# Patient Record
Sex: FEMALE | Race: WHITE | Employment: OTHER | URBAN - METROPOLITAN AREA
[De-identification: names, ages, dates, MRNs, and addresses within clinical notes are randomized per-mention and may not be internally consistent; named-entity substitution may affect disease eponyms.]

---

## 2017-01-09 ENCOUNTER — OFFICE VISIT (OUTPATIENT)
Dept: FAMILY MEDICINE CLINIC | Age: 77
End: 2017-01-09

## 2017-01-09 VITALS
OXYGEN SATURATION: 98 % | HEIGHT: 64 IN | WEIGHT: 143 LBS | DIASTOLIC BLOOD PRESSURE: 70 MMHG | TEMPERATURE: 100.1 F | BODY MASS INDEX: 24.41 KG/M2 | HEART RATE: 105 BPM | SYSTOLIC BLOOD PRESSURE: 108 MMHG

## 2017-01-09 DIAGNOSIS — J02.9 PHARYNGITIS, UNSPECIFIED ETIOLOGY: Primary | ICD-10-CM

## 2017-01-09 DIAGNOSIS — R05.9 COUGH: ICD-10-CM

## 2017-01-09 PROCEDURE — 99213 OFFICE O/P EST LOW 20 MIN: CPT | Performed by: FAMILY MEDICINE

## 2017-01-09 RX ORDER — BENZONATATE 100 MG/1
100 CAPSULE ORAL 3 TIMES DAILY PRN
Qty: 20 CAPSULE | Refills: 0 | Status: SHIPPED | OUTPATIENT
Start: 2017-01-09 | End: 2017-01-16

## 2017-01-09 RX ORDER — LEVOFLOXACIN 500 MG/1
500 TABLET, FILM COATED ORAL DAILY
Qty: 7 TABLET | Refills: 0 | Status: SHIPPED | OUTPATIENT
Start: 2017-01-09 | End: 2017-01-12 | Stop reason: SINTOL

## 2017-01-09 ASSESSMENT — ENCOUNTER SYMPTOMS
BACK PAIN: 0
SINUS PRESSURE: 0
COUGH: 1
EYE DISCHARGE: 0
BLOOD IN STOOL: 0
SORE THROAT: 0
CHEST TIGHTNESS: 1
VOMITING: 0
NAUSEA: 0
SHORTNESS OF BREATH: 0
DIARRHEA: 0

## 2017-01-09 ASSESSMENT — PATIENT HEALTH QUESTIONNAIRE - PHQ9
SUM OF ALL RESPONSES TO PHQ QUESTIONS 1-9: 0
2. FEELING DOWN, DEPRESSED OR HOPELESS: 0
1. LITTLE INTEREST OR PLEASURE IN DOING THINGS: 0
SUM OF ALL RESPONSES TO PHQ9 QUESTIONS 1 & 2: 0

## 2017-01-11 ENCOUNTER — TELEPHONE (OUTPATIENT)
Dept: FAMILY MEDICINE CLINIC | Age: 77
End: 2017-01-11

## 2017-01-12 ENCOUNTER — OFFICE VISIT (OUTPATIENT)
Dept: FAMILY MEDICINE CLINIC | Age: 77
End: 2017-01-12

## 2017-01-12 VITALS
HEART RATE: 92 BPM | SYSTOLIC BLOOD PRESSURE: 108 MMHG | BODY MASS INDEX: 24.21 KG/M2 | WEIGHT: 141.8 LBS | OXYGEN SATURATION: 98 % | DIASTOLIC BLOOD PRESSURE: 78 MMHG | TEMPERATURE: 98.1 F | HEIGHT: 64 IN

## 2017-01-12 DIAGNOSIS — J02.9 PHARYNGITIS, UNSPECIFIED ETIOLOGY: ICD-10-CM

## 2017-01-12 DIAGNOSIS — B37.0 ORAL CANDIDIASIS: Primary | ICD-10-CM

## 2017-01-12 PROCEDURE — 99213 OFFICE O/P EST LOW 20 MIN: CPT | Performed by: FAMILY MEDICINE

## 2017-01-12 RX ORDER — METHYLPREDNISOLONE 4 MG/1
TABLET ORAL
Qty: 1 KIT | Refills: 0 | Status: SHIPPED | OUTPATIENT
Start: 2017-01-12 | End: 2017-01-18

## 2017-01-12 RX ORDER — AZITHROMYCIN 250 MG/1
TABLET, FILM COATED ORAL
Qty: 1 PACKET | Refills: 0 | Status: SHIPPED | OUTPATIENT
Start: 2017-01-12 | End: 2017-04-25 | Stop reason: ALTCHOICE

## 2017-01-12 ASSESSMENT — ENCOUNTER SYMPTOMS
VOMITING: 0
SINUS PRESSURE: 0
SHORTNESS OF BREATH: 0
COUGH: 1
NAUSEA: 0
SORE THROAT: 0
BLOOD IN STOOL: 0
EYE DISCHARGE: 0
RHINORRHEA: 1
DIARRHEA: 0
BACK PAIN: 0

## 2017-01-16 ENCOUNTER — TELEPHONE (OUTPATIENT)
Dept: FAMILY MEDICINE CLINIC | Age: 77
End: 2017-01-16

## 2017-04-25 ENCOUNTER — OFFICE VISIT (OUTPATIENT)
Dept: FAMILY MEDICINE CLINIC | Age: 77
End: 2017-04-25

## 2017-04-25 VITALS
HEIGHT: 64 IN | WEIGHT: 141.6 LBS | DIASTOLIC BLOOD PRESSURE: 70 MMHG | TEMPERATURE: 97.7 F | OXYGEN SATURATION: 99 % | HEART RATE: 68 BPM | BODY MASS INDEX: 24.17 KG/M2 | SYSTOLIC BLOOD PRESSURE: 102 MMHG

## 2017-04-25 DIAGNOSIS — J02.9 PHARYNGITIS, UNSPECIFIED ETIOLOGY: Primary | ICD-10-CM

## 2017-04-25 PROCEDURE — 1090F PRES/ABSN URINE INCON ASSESS: CPT | Performed by: FAMILY MEDICINE

## 2017-04-25 PROCEDURE — 1036F TOBACCO NON-USER: CPT | Performed by: FAMILY MEDICINE

## 2017-04-25 PROCEDURE — 99213 OFFICE O/P EST LOW 20 MIN: CPT | Performed by: FAMILY MEDICINE

## 2017-04-25 PROCEDURE — 1123F ACP DISCUSS/DSCN MKR DOCD: CPT | Performed by: FAMILY MEDICINE

## 2017-04-25 PROCEDURE — G8400 PT W/DXA NO RESULTS DOC: HCPCS | Performed by: FAMILY MEDICINE

## 2017-04-25 PROCEDURE — G8420 CALC BMI NORM PARAMETERS: HCPCS | Performed by: FAMILY MEDICINE

## 2017-04-25 PROCEDURE — 4040F PNEUMOC VAC/ADMIN/RCVD: CPT | Performed by: FAMILY MEDICINE

## 2017-04-25 PROCEDURE — G8427 DOCREV CUR MEDS BY ELIG CLIN: HCPCS | Performed by: FAMILY MEDICINE

## 2017-04-25 RX ORDER — DOXYCYCLINE HYCLATE 100 MG
100 TABLET ORAL 2 TIMES DAILY
Qty: 14 TABLET | Refills: 0 | Status: SHIPPED | OUTPATIENT
Start: 2017-04-25 | End: 2017-05-02

## 2017-04-25 ASSESSMENT — ENCOUNTER SYMPTOMS
COUGH: 1
VOMITING: 0
EYE DISCHARGE: 0
BLOOD IN STOOL: 0
DIARRHEA: 0
SHORTNESS OF BREATH: 0
BACK PAIN: 0
SINUS PRESSURE: 0
SORE THROAT: 1
ABDOMINAL PAIN: 0
NAUSEA: 0

## 2017-04-27 ENCOUNTER — TELEPHONE (OUTPATIENT)
Dept: FAMILY MEDICINE CLINIC | Age: 77
End: 2017-04-27

## 2017-07-19 ENCOUNTER — OFFICE VISIT (OUTPATIENT)
Dept: FAMILY MEDICINE CLINIC | Age: 77
End: 2017-07-19

## 2017-07-19 VITALS
BODY MASS INDEX: 24.66 KG/M2 | HEART RATE: 53 BPM | DIASTOLIC BLOOD PRESSURE: 74 MMHG | WEIGHT: 139.2 LBS | SYSTOLIC BLOOD PRESSURE: 122 MMHG | HEIGHT: 63 IN | OXYGEN SATURATION: 97 %

## 2017-07-19 DIAGNOSIS — M81.0 OSTEOPOROSIS OF MULTIPLE SITES WITHOUT PATHOLOGICAL FRACTURE: ICD-10-CM

## 2017-07-19 DIAGNOSIS — Z76.89 ENCOUNTER TO ESTABLISH CARE: ICD-10-CM

## 2017-07-19 DIAGNOSIS — R59.0 ENLARGED LYMPH NODE IN NECK: Primary | ICD-10-CM

## 2017-07-19 DIAGNOSIS — F51.02 ADJUSTMENT INSOMNIA: Primary | ICD-10-CM

## 2017-07-19 DIAGNOSIS — F51.02 ADJUSTMENT INSOMNIA: ICD-10-CM

## 2017-07-19 DIAGNOSIS — Z23 NEED FOR PROPHYLACTIC VACCINATION AGAINST STREPTOCOCCUS PNEUMONIAE (PNEUMOCOCCUS): ICD-10-CM

## 2017-07-19 PROCEDURE — G0009 ADMIN PNEUMOCOCCAL VACCINE: HCPCS | Performed by: FAMILY MEDICINE

## 2017-07-19 PROCEDURE — 1123F ACP DISCUSS/DSCN MKR DOCD: CPT | Performed by: FAMILY MEDICINE

## 2017-07-19 PROCEDURE — 90670 PCV13 VACCINE IM: CPT | Performed by: FAMILY MEDICINE

## 2017-07-19 PROCEDURE — 1036F TOBACCO NON-USER: CPT | Performed by: FAMILY MEDICINE

## 2017-07-19 PROCEDURE — 4005F PHARM THX FOR OP RXD: CPT | Performed by: FAMILY MEDICINE

## 2017-07-19 PROCEDURE — 99214 OFFICE O/P EST MOD 30 MIN: CPT | Performed by: FAMILY MEDICINE

## 2017-07-19 PROCEDURE — G8400 PT W/DXA NO RESULTS DOC: HCPCS | Performed by: FAMILY MEDICINE

## 2017-07-19 PROCEDURE — 4040F PNEUMOC VAC/ADMIN/RCVD: CPT | Performed by: FAMILY MEDICINE

## 2017-07-19 PROCEDURE — 1090F PRES/ABSN URINE INCON ASSESS: CPT | Performed by: FAMILY MEDICINE

## 2017-07-19 PROCEDURE — G8427 DOCREV CUR MEDS BY ELIG CLIN: HCPCS | Performed by: FAMILY MEDICINE

## 2017-07-19 PROCEDURE — G8419 CALC BMI OUT NRM PARAM NOF/U: HCPCS | Performed by: FAMILY MEDICINE

## 2017-07-19 RX ORDER — ASCORBIC ACID 500 MG
500 TABLET ORAL DAILY
COMMUNITY

## 2017-07-19 RX ORDER — UBIDECARENONE 75 MG
50 CAPSULE ORAL DAILY
COMMUNITY

## 2017-07-19 RX ORDER — IBANDRONATE SODIUM 150 MG/1
150 TABLET, FILM COATED ORAL
COMMUNITY
End: 2017-09-13 | Stop reason: SINTOL

## 2017-07-19 RX ORDER — ZOLPIDEM TARTRATE 10 MG/1
10 TABLET ORAL
COMMUNITY
End: 2017-07-19 | Stop reason: SDUPTHER

## 2017-07-19 RX ORDER — VIT A/VIT C/VIT E/ZINC/COPPER 4296-226
CAPSULE ORAL
COMMUNITY
End: 2018-10-03 | Stop reason: SDUPTHER

## 2017-07-21 ENCOUNTER — TELEPHONE (OUTPATIENT)
Dept: FAMILY MEDICINE CLINIC | Age: 77
End: 2017-07-21

## 2017-07-21 PROBLEM — M81.0 OSTEOPOROSIS OF MULTIPLE SITES WITHOUT PATHOLOGICAL FRACTURE: Status: ACTIVE | Noted: 2017-07-21

## 2017-07-21 PROBLEM — R59.0 ENLARGED LYMPH NODE IN NECK: Status: ACTIVE | Noted: 2017-07-21

## 2017-07-21 PROBLEM — J02.9 PHARYNGITIS: Status: RESOLVED | Noted: 2017-01-09 | Resolved: 2017-07-21

## 2017-07-21 PROBLEM — F51.02 ADJUSTMENT INSOMNIA: Status: ACTIVE | Noted: 2017-07-21

## 2017-07-21 PROBLEM — B37.0 ORAL CANDIDIASIS: Status: RESOLVED | Noted: 2017-01-12 | Resolved: 2017-07-21

## 2017-07-21 RX ORDER — ZOLPIDEM TARTRATE 10 MG/1
10 TABLET ORAL NIGHTLY PRN
Qty: 30 TABLET | Refills: 2 | Status: SHIPPED | OUTPATIENT
Start: 2017-07-21 | End: 2018-03-14 | Stop reason: SDUPTHER

## 2017-07-21 ASSESSMENT — ENCOUNTER SYMPTOMS
COUGH: 0
DIARRHEA: 0
VOMITING: 0
NAUSEA: 0
CONSTIPATION: 0
SORE THROAT: 0
SHORTNESS OF BREATH: 0
BACK PAIN: 1
ABDOMINAL DISTENTION: 0
SINUS PRESSURE: 0

## 2017-07-24 ENCOUNTER — OFFICE VISIT (OUTPATIENT)
Dept: FAMILY MEDICINE CLINIC | Age: 77
End: 2017-07-24

## 2017-07-24 VITALS
SYSTOLIC BLOOD PRESSURE: 118 MMHG | HEART RATE: 70 BPM | OXYGEN SATURATION: 95 % | DIASTOLIC BLOOD PRESSURE: 68 MMHG | WEIGHT: 138 LBS | HEIGHT: 62 IN | BODY MASS INDEX: 25.4 KG/M2

## 2017-07-24 DIAGNOSIS — F51.02 ADJUSTMENT INSOMNIA: ICD-10-CM

## 2017-07-24 DIAGNOSIS — T78.40XA ALLERGIC REACTION TO DRUG, INITIAL ENCOUNTER: Primary | ICD-10-CM

## 2017-07-24 PROCEDURE — 4040F PNEUMOC VAC/ADMIN/RCVD: CPT | Performed by: FAMILY MEDICINE

## 2017-07-24 PROCEDURE — 1123F ACP DISCUSS/DSCN MKR DOCD: CPT | Performed by: FAMILY MEDICINE

## 2017-07-24 PROCEDURE — 1090F PRES/ABSN URINE INCON ASSESS: CPT | Performed by: FAMILY MEDICINE

## 2017-07-24 PROCEDURE — G8419 CALC BMI OUT NRM PARAM NOF/U: HCPCS | Performed by: FAMILY MEDICINE

## 2017-07-24 PROCEDURE — 1036F TOBACCO NON-USER: CPT | Performed by: FAMILY MEDICINE

## 2017-07-24 PROCEDURE — 99213 OFFICE O/P EST LOW 20 MIN: CPT | Performed by: FAMILY MEDICINE

## 2017-07-24 PROCEDURE — G8427 DOCREV CUR MEDS BY ELIG CLIN: HCPCS | Performed by: FAMILY MEDICINE

## 2017-07-24 PROCEDURE — G8400 PT W/DXA NO RESULTS DOC: HCPCS | Performed by: FAMILY MEDICINE

## 2017-07-24 ASSESSMENT — ENCOUNTER SYMPTOMS
VOMITING: 0
COUGH: 0
CONSTIPATION: 0
ABDOMINAL DISTENTION: 0
SINUS PRESSURE: 0
BACK PAIN: 1
SORE THROAT: 0
SHORTNESS OF BREATH: 0
DIARRHEA: 0
NAUSEA: 0

## 2017-09-13 ENCOUNTER — OFFICE VISIT (OUTPATIENT)
Dept: FAMILY MEDICINE CLINIC | Age: 77
End: 2017-09-13

## 2017-09-13 VITALS
WEIGHT: 136.8 LBS | HEIGHT: 63 IN | RESPIRATION RATE: 14 BRPM | OXYGEN SATURATION: 96 % | HEART RATE: 66 BPM | SYSTOLIC BLOOD PRESSURE: 124 MMHG | BODY MASS INDEX: 24.24 KG/M2 | DIASTOLIC BLOOD PRESSURE: 70 MMHG

## 2017-09-13 DIAGNOSIS — M81.0 OSTEOPOROSIS OF MULTIPLE SITES WITHOUT PATHOLOGICAL FRACTURE: ICD-10-CM

## 2017-09-13 DIAGNOSIS — F51.02 ADJUSTMENT INSOMNIA: Primary | ICD-10-CM

## 2017-09-13 PROCEDURE — 99213 OFFICE O/P EST LOW 20 MIN: CPT | Performed by: FAMILY MEDICINE

## 2017-09-13 PROCEDURE — G8420 CALC BMI NORM PARAMETERS: HCPCS | Performed by: FAMILY MEDICINE

## 2017-09-13 PROCEDURE — 1123F ACP DISCUSS/DSCN MKR DOCD: CPT | Performed by: FAMILY MEDICINE

## 2017-09-13 PROCEDURE — 1036F TOBACCO NON-USER: CPT | Performed by: FAMILY MEDICINE

## 2017-09-13 PROCEDURE — 1090F PRES/ABSN URINE INCON ASSESS: CPT | Performed by: FAMILY MEDICINE

## 2017-09-13 PROCEDURE — G8400 PT W/DXA NO RESULTS DOC: HCPCS | Performed by: FAMILY MEDICINE

## 2017-09-13 PROCEDURE — 4005F PHARM THX FOR OP RXD: CPT | Performed by: FAMILY MEDICINE

## 2017-09-13 PROCEDURE — 4040F PNEUMOC VAC/ADMIN/RCVD: CPT | Performed by: FAMILY MEDICINE

## 2017-09-13 PROCEDURE — G8427 DOCREV CUR MEDS BY ELIG CLIN: HCPCS | Performed by: FAMILY MEDICINE

## 2017-09-13 ASSESSMENT — ENCOUNTER SYMPTOMS
VOMITING: 0
NAUSEA: 0
SHORTNESS OF BREATH: 0
BACK PAIN: 1
DIARRHEA: 0
ABDOMINAL DISTENTION: 0
CONSTIPATION: 0
SINUS PRESSURE: 0
COUGH: 0
SORE THROAT: 0

## 2017-09-17 PROBLEM — Z96.0 VAGINAL PESSARY IN SITU: Status: ACTIVE | Noted: 2017-08-01

## 2017-12-06 ENCOUNTER — OFFICE VISIT (OUTPATIENT)
Dept: FAMILY MEDICINE CLINIC | Age: 77
End: 2017-12-06

## 2017-12-06 VITALS
OXYGEN SATURATION: 97 % | DIASTOLIC BLOOD PRESSURE: 74 MMHG | WEIGHT: 141.8 LBS | BODY MASS INDEX: 25.12 KG/M2 | SYSTOLIC BLOOD PRESSURE: 116 MMHG | HEART RATE: 86 BPM

## 2017-12-06 DIAGNOSIS — M79.604 ACUTE LEG PAIN, RIGHT: ICD-10-CM

## 2017-12-06 DIAGNOSIS — N30.01 ACUTE CYSTITIS WITH HEMATURIA: Primary | ICD-10-CM

## 2017-12-06 DIAGNOSIS — R30.0 DYSURIA: ICD-10-CM

## 2017-12-06 PROBLEM — Z96.0 VAGINAL PESSARY IN SITU: Status: RESOLVED | Noted: 2017-08-01 | Resolved: 2017-12-06

## 2017-12-06 LAB
BACTERIA: ABNORMAL /HPF
BILIRUBIN URINE: NEGATIVE
BILIRUBIN, POC: ABNORMAL
BLOOD URINE, POC: ABNORMAL
BLOOD, URINE: NEGATIVE
CLARITY, POC: ABNORMAL
CLARITY: ABNORMAL
COLOR, POC: ABNORMAL
COLOR: YELLOW
EPITHELIAL CELLS, UA: 3 /HPF (ref 0–5)
GLUCOSE URINE, POC: ABNORMAL
GLUCOSE URINE: NEGATIVE MG/DL
HYALINE CASTS: 0 /LPF (ref 0–8)
KETONES, POC: ABNORMAL
KETONES, URINE: NEGATIVE MG/DL
LEUKOCYTE EST, POC: POSITIVE
LEUKOCYTE ESTERASE, URINE: ABNORMAL
MICROSCOPIC EXAMINATION: YES
NITRITE, POC: ABNORMAL
NITRITE, URINE: POSITIVE
PH UA: 6
PH, POC: 6
PROTEIN UA: NEGATIVE MG/DL
PROTEIN, POC: ABNORMAL
RBC UA: 3 /HPF (ref 0–4)
SPECIFIC GRAVITY UA: 1.02
SPECIFIC GRAVITY, POC: 1.02
UROBILINOGEN, POC: 0.2
UROBILINOGEN, URINE: 0.2 E.U./DL
WBC UA: 343 /HPF (ref 0–5)

## 2017-12-06 PROCEDURE — 81001 URINALYSIS AUTO W/SCOPE: CPT | Performed by: FAMILY MEDICINE

## 2017-12-06 PROCEDURE — 99213 OFFICE O/P EST LOW 20 MIN: CPT | Performed by: FAMILY MEDICINE

## 2017-12-06 PROCEDURE — G8484 FLU IMMUNIZE NO ADMIN: HCPCS | Performed by: FAMILY MEDICINE

## 2017-12-06 PROCEDURE — G8400 PT W/DXA NO RESULTS DOC: HCPCS | Performed by: FAMILY MEDICINE

## 2017-12-06 PROCEDURE — 4040F PNEUMOC VAC/ADMIN/RCVD: CPT | Performed by: FAMILY MEDICINE

## 2017-12-06 PROCEDURE — 1090F PRES/ABSN URINE INCON ASSESS: CPT | Performed by: FAMILY MEDICINE

## 2017-12-06 PROCEDURE — G8427 DOCREV CUR MEDS BY ELIG CLIN: HCPCS | Performed by: FAMILY MEDICINE

## 2017-12-06 PROCEDURE — G8417 CALC BMI ABV UP PARAM F/U: HCPCS | Performed by: FAMILY MEDICINE

## 2017-12-06 PROCEDURE — 1036F TOBACCO NON-USER: CPT | Performed by: FAMILY MEDICINE

## 2017-12-06 PROCEDURE — 1123F ACP DISCUSS/DSCN MKR DOCD: CPT | Performed by: FAMILY MEDICINE

## 2017-12-06 RX ORDER — NITROFURANTOIN 25; 75 MG/1; MG/1
100 CAPSULE ORAL 2 TIMES DAILY
Qty: 20 CAPSULE | Refills: 0 | Status: SHIPPED | OUTPATIENT
Start: 2017-12-06 | End: 2017-12-08 | Stop reason: ALTCHOICE

## 2017-12-06 ASSESSMENT — ENCOUNTER SYMPTOMS
ABDOMINAL DISTENTION: 0
VOMITING: 0
COUGH: 0
NAUSEA: 0
SINUS PRESSURE: 0
SORE THROAT: 0
SHORTNESS OF BREATH: 0
BACK PAIN: 1
DIARRHEA: 0
CONSTIPATION: 0

## 2017-12-06 NOTE — PROGRESS NOTES
Vicky Range  1940  76 y.o. Allergies   Allergen Reactions    Prevnar 13 [Pneumococcal 13-Latisha Conj Vacc] Rash     Swelling of injection site and upper arm for more than 7 days. No infection.  Amoxicillin Rash    Flexeril [Cyclobenzaprine Hcl] Nausea And Vomiting       Chief Complaint   Patient presents with    Urinary Tract Infection     Urination every 10 to 15 minutes,  burning, yellow, ordor.  Ankle Pain     going to the knee, last for ten minutes. happen once. History of Present Illness  Dr Wiliam Gallegos:  Mrs. Allyn Quiroga is a 76 y.o. White woman with Osteoporosis and other medical problems who presents today for:     Right foot pain: On 12/3/2017, was busy at [employer], then got home late, laid down for a few minutes, severe pain made her jump up: was in pain for 10 minutes which then subsided. Started in the lateral ankle then went to the outside of the knee. No tenderness or tightness in the muscle. No tingling or weakness. Musculoskeletal ROS: negative for - gait disturbance, joint stiffness, joint swelling, muscle pain and muscular weakness. No pain since. UTI: started having symptoms on Dec. 1, frequency, urgency, No fever/chills, no vaginal discharge. The patient does have mild suprapubic pain. No flank pain. Insomnia: Patient states she is sleeping fine. The patient is currently taking Ambien (1/3 of a 10mg tab) about 3/7 nights/week. The other nights she takes melatonin. She denies any symptoms of abnormal behavior during the night when she is on Ambien. She states it helps her sleep very well. Osteoporosis: Patient has decided not to take osteoporosis medication secondary to side effects. Heart valve: She reports that she has a \"leaky valve\" for which she will see Dr. Brown Eller, her cardiologist, states is doing well. Bladder prolapse: She tried the pessary, but it came out and she has decided not to use it. Exercise: Walking with her neighbor 4 days/week.      The patient is  since 2007. She has 4 children in [facility], and [place], and several grandchildren. 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 She went to [facility] in 2012. She babysits her 25month-old granddaughter 2 times per week. Recently, her brother has been diagnosed with recurrence of breast CA and is starting chemo (9/2017)    The patient  reports that she has never smoked. She has never used smokeless tobacco..  The patient also  reports that she does not drink alcohol. Patient Medical History:    Medications:   On presentation AND new orders today :  Outpatient Prescriptions Marked as Taking for the 12/6/17 encounter (Office Visit) with Brett Mosqueda MD   Medication Sig Dispense Refill    nitrofurantoin, macrocrystal-monohydrate, (MACROBID) 100 MG capsule Take 1 capsule by mouth 2 times daily for 10 days 20 capsule 0    Melatonin-Pyridoxine (MELATONEX PO) Take 5 mg by mouth as needed      zolpidem (AMBIEN) 10 MG tablet Take 1 tablet by mouth nightly as needed for Sleep (Patient taking differently: Take 10 mg by mouth nightly as needed for Sleep Indications: taking 1/3 tab at night PRN ) 30 tablet 2    vitamin B-12 (CYANOCOBALAMIN) 100 MCG tablet Take 50 mcg by mouth daily      Ascorbic Acid (VITAMIN C) 500 MG tablet Take 500 mg by mouth daily      Multiple Vitamins-Minerals (ICAPS) CAPS Take by mouth      TURMERIC PO Take 1 tablet by mouth daily      CINNAMON PO Take by mouth daily      Multiple Vitamin (MULTI VITAMIN DAILY PO) Take by mouth daily      Calcium Citrate-Vitamin D (CALCIUM + D PO) Take by mouth daily         Past Medical History:   Diagnosis Date    [facility] ([facility] [address]     Dysuria     Edema     Female bladder prolapse     Hernia, hiatal     Insomnia     Lipoma of neck     Lumbago     Nodule of kidney     Osteoarthritis     Plantar fasciitis     PVD (peripheral vascular disease) (HCC)     Sinusitis     Varicose veins of both lower extremities 2014    Dr Baljit Mcdaniel        Past Surgical History:   Procedure Laterality Date    BLADDER REPAIR      HERNIA REPAIR      HYSTERECTOMY  10/10/2005    cervix ca-adenoid basal epithelioma    KNEE ARTHROSCOPY Left 04/07/2014    Dr Niecy Ospina        Family History   Problem Relation Age of Onset    Diabetes Mother     Colon Cancer Mother     Heart Failure Father     Cancer Sister      tonsil, galbladder    Diabetes Sister     Heart Failure Sister     Hypertension Sister    [place] Migraines Sister     Diabetes Brother     Heart Failure Brother     Cancer Other      several aunts and uncles    Breast Cancer Brother        Social History     Social History    Marital status:      Spouse name: N/A    Number of children: N/A    Years of education: N/A     Occupational History    Not on file. Social History Main Topics    Smoking status: Never Smoker    Smokeless tobacco: Never Used    Alcohol use No    Drug use: No    Sexual activity: No     Other Topics Concern    Not on file     Social History Narrative    No narrative on file       Review of Systems   Constitutional: Negative for chills and fatigue. HENT: Positive for hearing loss. Negative for congestion, sinus pressure and sore throat. Respiratory: Negative for cough and shortness of breath. Cardiovascular: Negative for chest pain, palpitations and leg swelling. Gastrointestinal: Negative for abdominal distention, constipation, diarrhea, nausea and vomiting. Endocrine: Negative for cold intolerance, heat intolerance, polydipsia and polyuria. Genitourinary: Positive for dysuria (occasional dysuria secondary to prolapse. ). Negative for frequency and hematuria. Dysuria worse in the last several days. See HPI. Musculoskeletal: Positive for back pain (sometimes). Negative for arthralgias. Neurological: Negative for dizziness, seizures, syncope, weakness and headaches.    Psychiatric/Behavioral:

## 2017-12-08 ENCOUNTER — TELEPHONE (OUTPATIENT)
Dept: FAMILY MEDICINE CLINIC | Age: 77
End: 2017-12-08

## 2017-12-08 DIAGNOSIS — N30.01 ACUTE CYSTITIS WITH HEMATURIA: Primary | ICD-10-CM

## 2017-12-08 LAB
ORGANISM: ABNORMAL
URINE CULTURE, ROUTINE: ABNORMAL
URINE CULTURE, ROUTINE: ABNORMAL

## 2017-12-08 RX ORDER — CIPROFLOXACIN 250 MG/1
250 TABLET, FILM COATED ORAL 2 TIMES DAILY
Qty: 10 TABLET | Refills: 0 | Status: SHIPPED | OUTPATIENT
Start: 2017-12-08 | End: 2018-01-10 | Stop reason: SDUPTHER

## 2017-12-08 NOTE — TELEPHONE ENCOUNTER
Please inform the patient that her urine culture came back showing that the medication I prescribed her will probably not work. I have sent in another prescription for ciprofloxacin twice a day for 5 days to her pharmacy. When she gets the new prescription she can stop the one I gave her at her appointment. If she has any questions please let me know.   Thank you, SABIHA

## 2018-01-10 ENCOUNTER — OFFICE VISIT (OUTPATIENT)
Dept: FAMILY MEDICINE CLINIC | Age: 78
End: 2018-01-10

## 2018-01-10 VITALS
DIASTOLIC BLOOD PRESSURE: 70 MMHG | TEMPERATURE: 97.2 F | HEART RATE: 80 BPM | SYSTOLIC BLOOD PRESSURE: 122 MMHG | WEIGHT: 140 LBS | OXYGEN SATURATION: 99 % | BODY MASS INDEX: 24.8 KG/M2

## 2018-01-10 DIAGNOSIS — N39.0 RECURRENT UTI: Primary | ICD-10-CM

## 2018-01-10 DIAGNOSIS — R35.0 URINARY FREQUENCY: ICD-10-CM

## 2018-01-10 DIAGNOSIS — R31.0 GROSS HEMATURIA: ICD-10-CM

## 2018-01-10 DIAGNOSIS — N81.10 BLADDER PROLAPSE, FEMALE, ACQUIRED: ICD-10-CM

## 2018-01-10 LAB
BILIRUBIN, POC: NEGATIVE
BLOOD URINE, POC: ABNORMAL
CLARITY, POC: CLEAR
COLOR, POC: YELLOW
GLUCOSE URINE, POC: NEGATIVE
KETONES, POC: NEGATIVE
LEUKOCYTE EST, POC: ABNORMAL
NITRITE, POC: POSITIVE
PH, POC: 6.5
PROTEIN, POC: NEGATIVE
SPECIFIC GRAVITY, POC: 1020
UROBILINOGEN, POC: 0.2

## 2018-01-10 PROCEDURE — G8420 CALC BMI NORM PARAMETERS: HCPCS | Performed by: FAMILY MEDICINE

## 2018-01-10 PROCEDURE — 81002 URINALYSIS NONAUTO W/O SCOPE: CPT | Performed by: FAMILY MEDICINE

## 2018-01-10 PROCEDURE — 1123F ACP DISCUSS/DSCN MKR DOCD: CPT | Performed by: FAMILY MEDICINE

## 2018-01-10 PROCEDURE — 1090F PRES/ABSN URINE INCON ASSESS: CPT | Performed by: FAMILY MEDICINE

## 2018-01-10 PROCEDURE — G8427 DOCREV CUR MEDS BY ELIG CLIN: HCPCS | Performed by: FAMILY MEDICINE

## 2018-01-10 PROCEDURE — 1036F TOBACCO NON-USER: CPT | Performed by: FAMILY MEDICINE

## 2018-01-10 PROCEDURE — 99213 OFFICE O/P EST LOW 20 MIN: CPT | Performed by: FAMILY MEDICINE

## 2018-01-10 PROCEDURE — 4040F PNEUMOC VAC/ADMIN/RCVD: CPT | Performed by: FAMILY MEDICINE

## 2018-01-10 PROCEDURE — G8484 FLU IMMUNIZE NO ADMIN: HCPCS | Performed by: FAMILY MEDICINE

## 2018-01-10 PROCEDURE — G8400 PT W/DXA NO RESULTS DOC: HCPCS | Performed by: FAMILY MEDICINE

## 2018-01-10 RX ORDER — CIPROFLOXACIN 250 MG/1
250 TABLET, FILM COATED ORAL 2 TIMES DAILY
Qty: 14 TABLET | Refills: 0 | Status: SHIPPED | OUTPATIENT
Start: 2018-01-10 | End: 2018-01-17

## 2018-01-10 ASSESSMENT — ENCOUNTER SYMPTOMS
CONSTIPATION: 0
DIARRHEA: 0
ABDOMINAL DISTENTION: 0
SORE THROAT: 0
COUGH: 0
SINUS PRESSURE: 0
VOMITING: 0
NAUSEA: 0
BACK PAIN: 1
SHORTNESS OF BREATH: 0

## 2018-01-10 ASSESSMENT — PATIENT HEALTH QUESTIONNAIRE - PHQ9
1. LITTLE INTEREST OR PLEASURE IN DOING THINGS: 0
2. FEELING DOWN, DEPRESSED OR HOPELESS: 0
SUM OF ALL RESPONSES TO PHQ QUESTIONS 1-9: 0
SUM OF ALL RESPONSES TO PHQ9 QUESTIONS 1 & 2: 0

## 2018-01-10 NOTE — PROGRESS NOTES
oz (64.3 kg)   09/13/17 136 lb 12.8 oz (62.1 kg)     Body mass index is 24.8 kg/m². Physical Exam   Constitutional: She is oriented to person, place, and time. She appears well-developed and well-nourished. The patient is in no distress. HENT:   Head: Normocephalic and atraumatic. Mouth/Throat: Oropharynx is clear and moist.   Cardiovascular: Normal rate, regular rhythm and normal heart sounds. Pulmonary/Chest: Breath sounds normal. She has no wheezes. She has no rales. Abdominal: Soft. Bowel sounds are normal. She exhibits no distension. There is no tenderness. There is no rebound. There is mild suprapubic tenderness. Musculoskeletal: She exhibits no edema, tenderness or deformity. Neurological: She is alert and oriented to person, place, and time. Skin: Skin is warm and dry. No rash noted. No erythema. Psychiatric: She has a normal mood and affect. Thought content normal.   Vitals reviewed. Today's Point of Care Results:   Results for POC orders placed in visit on 01/10/18   POCT Urinalysis no Micro   Result Value Ref Range    Color, UA yellow     Clarity, UA clear     Glucose, UA POC negative     Bilirubin, UA negative     Ketones, UA negative     Spec Grav, UA 1,020     Blood, UA POC trace-intact     pH, UA 6.5     Protein, UA POC negative     Urobilinogen, UA 0.2     Leukocytes, UA small     Nitrite, UA positive         Diagnosis, Assessment, Plan, and Associated Orders:    1. Recurrent UTI  ciprofloxacin (CIPRO) 250 MG tablet   2. Urinary frequency  POCT Urinalysis no Micro    Urine culture clean catch   3. Gross hematuria  POCT Urinalysis no Micro    Urine culture clean catch   4. Bladder prolapse, female, acquired       Plan:   Last culture sensitive to Cipro. Will send Culture. The patient believes that nothing can be done about her prolapse without surgery. She feels she needs to take care of her granddaughter's baby, so doesn't have time for the surgery.     Lakshmi received

## 2018-01-12 PROBLEM — Z63.4 WIDOWED: Status: ACTIVE | Noted: 2018-01-12

## 2018-01-12 PROBLEM — Z87.440 HISTORY OF RECURRENT UTIS: Status: ACTIVE | Noted: 2018-01-12

## 2018-01-12 PROBLEM — I35.1 MODERATE AORTIC REGURGITATION: Status: ACTIVE | Noted: 2018-01-12

## 2018-01-12 LAB
ORGANISM: ABNORMAL
URINE CULTURE, ROUTINE: ABNORMAL
URINE CULTURE, ROUTINE: ABNORMAL

## 2018-03-14 ENCOUNTER — OFFICE VISIT (OUTPATIENT)
Dept: FAMILY MEDICINE CLINIC | Age: 78
End: 2018-03-14

## 2018-03-14 VITALS
SYSTOLIC BLOOD PRESSURE: 124 MMHG | HEART RATE: 72 BPM | HEIGHT: 63 IN | OXYGEN SATURATION: 98 % | BODY MASS INDEX: 24.31 KG/M2 | WEIGHT: 137.2 LBS | DIASTOLIC BLOOD PRESSURE: 84 MMHG

## 2018-03-14 DIAGNOSIS — F51.02 ADJUSTMENT INSOMNIA: ICD-10-CM

## 2018-03-14 DIAGNOSIS — Z00.00 ROUTINE GENERAL MEDICAL EXAMINATION AT A HEALTH CARE FACILITY: Primary | ICD-10-CM

## 2018-03-14 PROCEDURE — G0438 PPPS, INITIAL VISIT: HCPCS | Performed by: FAMILY MEDICINE

## 2018-03-14 RX ORDER — ZOLPIDEM TARTRATE 10 MG/1
10 TABLET ORAL NIGHTLY PRN
Qty: 30 TABLET | Refills: 1 | Status: SHIPPED | OUTPATIENT
Start: 2018-03-14 | End: 2018-09-17 | Stop reason: SDUPTHER

## 2018-03-14 ASSESSMENT — PATIENT HEALTH QUESTIONNAIRE - PHQ9: SUM OF ALL RESPONSES TO PHQ QUESTIONS 1-9: 0

## 2018-03-14 ASSESSMENT — ANXIETY QUESTIONNAIRES: GAD7 TOTAL SCORE: 0

## 2018-03-14 ASSESSMENT — LIFESTYLE VARIABLES: HOW OFTEN DO YOU HAVE A DRINK CONTAINING ALCOHOL: 0

## 2018-03-14 NOTE — PROGRESS NOTES
([facility] [address]     Sinusitis     Varicose veins of both lower extremities 2014    Dr Cathleen Ceballos     Past Surgical History:   Procedure Laterality Date    BLADDER REPAIR  2005    HERNIA REPAIR      HYSTERECTOMY, TOTAL ABDOMINAL  10/10/2015    cervix ca-adenoid basal epithelioma    KNEE ARTHROSCOPY Left 04/07/2014    Dr Dorcas Montes       Family History   Problem Relation Age of Onset    Diabetes Mother     Colon Cancer Mother     Heart Failure Father     Cancer Sister      tonsil, galbladder    Diabetes Sister     Heart Failure Sister     Hypertension Sister    [place] Migraines Sister     Diabetes Brother     Heart Failure Brother     Cancer Other      several aunts and uncles    Breast Cancer Brother        CareTeam (Including outside providers/suppliers regularly involved in providing care):   Patient Care Team:  Wolfgang Hatchet, MD as PCP - General (Family Medicine)  Wolfgang Hatchet, MD as PCP - [facility] Attributed Provider  Chaparro Russ MD as Consulting Physician (Gastroenterology)  Mynor Engel MD as Consulting Physician (Cardiology)    Wt Readings from Last 3 Encounters:   03/14/18 137 lb 3.2 oz (62.2 kg)   01/10/18 140 lb (63.5 kg)   12/06/17 141 lb 12.8 oz (64.3 kg)     Vitals:    03/14/18 0956   BP: 124/84   Site: Left Arm   Position: Sitting   Cuff Size: Medium Adult   Pulse: 72   SpO2: 98%   Weight: 137 lb 3.2 oz (62.2 kg)   Height: 5' 3.39\" (1.61 m)       General Appearance: alert and oriented to person, place and time, well developed and well- nourished, in no acute distress. Appears younger than stated age.    Skin: warm and dry, no rash or erythema  Head: normocephalic and atraumatic  Eyes: pupils equal, round, and reactive to light,    ENT: tympanic membrane, external ear and ear canal normal bilaterally, nose without deformity,   Neck: supple and non-tender without mass,  no cervical lymphadenopathy  Pulmonary/Chest: clear to auscultation bilaterally- no wheezes, rales or rhonchi, normal air movement, no respiratory distress  Cardiovascular: normal rate, regular rhythm, normal S1 and S2, Has aortic regurgitation. Abdomen: soft, non-tender, non-distended, normal bowel sounds, no masses or organomegaly  Extremities: no cyanosis, clubbing or edema  Musculoskeletal: normal range of motion for age, no joint swelling, deformity or tenderness  Neurologic: reflexes normal and symmetric, no cranial nerve deficit, gait, coordination and speech normal    Patient's complete Health Risk Assessment and screening values have been reviewed and are found in Flowsheets. The following problems were reviewed today and where indicated follow up appointments were made and/or referrals ordered. Positive Risk Factor Screenings with Interventions:       Hearing/Vision Interventions:  Plans to get a vision check soon. Last glasses 1-2 years ago. Sees Dr. Jennifer Sahu    Safety Interventions:  · Home safety tips provided  Rugs that she uses are non-slip. Diagnosis and Plan:     1. Routine general medical examination at a health care facility     2. Adjustment insomnia  zolpidem (AMBIEN) 10 MG tablet     Patient will continue following with cardiology for valve. Continue Zolpidem 10 mg, 1/3 tablet nightly as needed for sleep. Personalized Preventive Plan     Current Health Maintenance Status  Immunization History   Administered Date(s) Administered    Pneumococcal 13-valent Conjugate ([id number]) 07/19/2017    Td 12/12/2011        Health Maintenance   Topic Date Due    Shingles Vaccine (1 of 2 - 2 Dose Series) 03/24/1988    DTaP/Tdap/Td vaccine (1 - Tdap) 12/13/2011    Flu vaccine (1) 09/01/2017    DEXA (modify frequency per FRAX score)  Completed     Recommendations for Preventive Services Due: see orders. Recommended screening schedule for the next 5-10 years is provided to the patient in written form: see Patient Instructions/AVS.    Return recheck sleep in 6 months, for Medicare Annual Wellness Visit in 1 year.

## 2018-03-15 ENCOUNTER — TELEPHONE (OUTPATIENT)
Dept: FAMILY MEDICINE CLINIC | Age: 78
End: 2018-03-15

## 2018-03-16 NOTE — TELEPHONE ENCOUNTER
I left a message on the patient's phone explaining that one prescription is enough for 3 months since she takes only 1/3 tab each night. One refill gives her six months of medication. Please try to contact the patient again to give her the above information.  SABIHA

## 2018-04-17 ENCOUNTER — TELEPHONE (OUTPATIENT)
Dept: FAMILY MEDICINE CLINIC | Age: 78
End: 2018-04-17

## 2018-04-18 ENCOUNTER — OFFICE VISIT (OUTPATIENT)
Dept: FAMILY MEDICINE CLINIC | Age: 78
End: 2018-04-18

## 2018-04-18 VITALS
DIASTOLIC BLOOD PRESSURE: 78 MMHG | WEIGHT: 139.6 LBS | OXYGEN SATURATION: 98 % | HEART RATE: 77 BPM | BODY MASS INDEX: 24.43 KG/M2 | TEMPERATURE: 97.9 F | SYSTOLIC BLOOD PRESSURE: 124 MMHG

## 2018-04-18 DIAGNOSIS — J01.90 ACUTE BACTERIAL SINUSITIS: Primary | ICD-10-CM

## 2018-04-18 DIAGNOSIS — R05.9 COUGH: ICD-10-CM

## 2018-04-18 DIAGNOSIS — B96.89 ACUTE BACTERIAL SINUSITIS: Primary | ICD-10-CM

## 2018-04-18 PROCEDURE — 99213 OFFICE O/P EST LOW 20 MIN: CPT | Performed by: NURSE PRACTITIONER

## 2018-04-18 RX ORDER — BENZONATATE 100 MG/1
100 CAPSULE ORAL 3 TIMES DAILY PRN
Qty: 21 CAPSULE | Refills: 0 | Status: SHIPPED | OUTPATIENT
Start: 2018-04-18 | End: 2018-04-25

## 2018-04-18 RX ORDER — DOXYCYCLINE HYCLATE 100 MG
100 TABLET ORAL 2 TIMES DAILY
Qty: 20 TABLET | Refills: 0 | Status: SHIPPED | OUTPATIENT
Start: 2018-04-18 | End: 2018-08-01 | Stop reason: SDUPTHER

## 2018-04-18 RX ORDER — GUAIFENESIN 600 MG/1
600 TABLET, EXTENDED RELEASE ORAL 2 TIMES DAILY
Qty: 30 TABLET | Refills: 0 | Status: SHIPPED | OUTPATIENT
Start: 2018-04-18 | End: 2018-10-03 | Stop reason: ALTCHOICE

## 2018-04-18 ASSESSMENT — ENCOUNTER SYMPTOMS
EYE REDNESS: 0
SINUS PRESSURE: 1
SINUS PAIN: 1
WHEEZING: 0
EYE ITCHING: 0
SHORTNESS OF BREATH: 0
SORE THROAT: 1
CHEST TIGHTNESS: 1
NAUSEA: 0
RHINORRHEA: 1
EYE DISCHARGE: 0
EYE PAIN: 0
VOMITING: 0
SWOLLEN GLANDS: 1
DIARRHEA: 0
COUGH: 1

## 2018-04-27 ENCOUNTER — TELEPHONE (OUTPATIENT)
Dept: FAMILY MEDICINE CLINIC | Age: 78
End: 2018-04-27

## 2018-04-27 DIAGNOSIS — L84 CORNS AND CALLUS: Primary | ICD-10-CM

## 2018-04-27 DIAGNOSIS — M21.611 BUNION, RIGHT: ICD-10-CM

## 2018-08-01 ENCOUNTER — OFFICE VISIT (OUTPATIENT)
Dept: FAMILY MEDICINE CLINIC | Age: 78
End: 2018-08-01

## 2018-08-01 VITALS
HEIGHT: 63 IN | TEMPERATURE: 98.1 F | WEIGHT: 139.8 LBS | DIASTOLIC BLOOD PRESSURE: 70 MMHG | BODY MASS INDEX: 24.77 KG/M2 | RESPIRATION RATE: 16 BRPM | SYSTOLIC BLOOD PRESSURE: 124 MMHG | HEART RATE: 77 BPM | OXYGEN SATURATION: 97 %

## 2018-08-01 DIAGNOSIS — F51.02 ADJUSTMENT INSOMNIA: ICD-10-CM

## 2018-08-01 DIAGNOSIS — J01.90 ACUTE BACTERIAL SINUSITIS: Primary | ICD-10-CM

## 2018-08-01 DIAGNOSIS — B96.89 ACUTE BACTERIAL SINUSITIS: Primary | ICD-10-CM

## 2018-08-01 PROCEDURE — 1036F TOBACCO NON-USER: CPT | Performed by: FAMILY MEDICINE

## 2018-08-01 PROCEDURE — 1123F ACP DISCUSS/DSCN MKR DOCD: CPT | Performed by: FAMILY MEDICINE

## 2018-08-01 PROCEDURE — 99213 OFFICE O/P EST LOW 20 MIN: CPT | Performed by: FAMILY MEDICINE

## 2018-08-01 PROCEDURE — G8420 CALC BMI NORM PARAMETERS: HCPCS | Performed by: FAMILY MEDICINE

## 2018-08-01 PROCEDURE — 1101F PT FALLS ASSESS-DOCD LE1/YR: CPT | Performed by: FAMILY MEDICINE

## 2018-08-01 PROCEDURE — G8427 DOCREV CUR MEDS BY ELIG CLIN: HCPCS | Performed by: FAMILY MEDICINE

## 2018-08-01 PROCEDURE — 4040F PNEUMOC VAC/ADMIN/RCVD: CPT | Performed by: FAMILY MEDICINE

## 2018-08-01 PROCEDURE — 1090F PRES/ABSN URINE INCON ASSESS: CPT | Performed by: FAMILY MEDICINE

## 2018-08-01 PROCEDURE — G8400 PT W/DXA NO RESULTS DOC: HCPCS | Performed by: FAMILY MEDICINE

## 2018-08-01 RX ORDER — DOXYCYCLINE HYCLATE 100 MG
100 TABLET ORAL 2 TIMES DAILY
Qty: 20 TABLET | Refills: 0 | Status: SHIPPED | OUTPATIENT
Start: 2018-08-01 | End: 2018-08-11

## 2018-08-01 RX ORDER — ZOLPIDEM TARTRATE 10 MG/1
TABLET ORAL NIGHTLY PRN
COMMUNITY
End: 2018-09-17 | Stop reason: SDUPTHER

## 2018-08-01 NOTE — PROGRESS NOTES
Keyonna Lambert  1940  [de-identified] y.o. Chief Complaint   Patient presents with    Congestion    Headache   . Subjective:      Dr Mitchell School: Keyonna Lambert is a [de-identified] y.o. White  woman who presents today for follow up on her chronic medical conditions as noted below and for URI:    Patient Active Problem List:     Chronic pain of both knees     Bladder prolapse, female, acquired     Enlarged lymph node in neck     Adjustment insomnia     Osteoporosis of multiple sites without pathological fracture     Moderate aortic regurgitation     History of recurrent UTIs          Upper Respiratory Infection: Patient complains of symptoms of a URI. Symptoms include congestion, cough, plugged sensation in nose and sore throat. Onset of symptoms was 8 days ago, gradually worsening since that time. She also c/o productive cough with  yellow colored sputum for the past 3 days . She is drinking plenty of fluids. Evaluation to date: none. Treatment to date: NSAID. For headache. Denies fever, had chills last night, full in the sinuses also, Getting worse not better. Wavy lines in eyes: many times in the past month, last month: left eye couldn't focus, no headache afterward. Explained these may be migraine associated. Had gone to [facility] for her [employer] graduation. Flew back on Sunday: there 5 days. Was sick while there. The patient  reports that she has never smoked. She has never used smokeless tobacco.. The patient also  reports that she does not drink alcohol. Allergies   Allergen Reactions    Prevnar 13 [Pneumococcal 13-Latisha Conj Vacc] Rash     Swelling of injection site and upper arm for more than 7 days. No infection.  Amoxicillin Rash    Flexeril [Cyclobenzaprine Hcl] Nausea And Vomiting       Medications:   On presentation AND new orders today :  Outpatient Prescriptions Marked as Taking for the 8/1/18 encounter (Office Visit) with Mitchell Cruz MD   Medication Sig Dispense Refill  zolpidem (AMBIEN) 10 MG tablet Take by mouth nightly as needed for Sleep. Sara Shelton doxycycline hyclate (VIBRA-TABS) 100 MG tablet Take 1 tablet by mouth 2 times daily for 10 days 20 tablet 0    Melatonin-Pyridoxine (MELATONEX PO) Take 5 mg by mouth as needed      vitamin B-12 (CYANOCOBALAMIN) 100 MCG tablet Take 50 mcg by mouth daily      Ascorbic Acid (VITAMIN C) 500 MG tablet Take 500 mg by mouth daily      Multiple Vitamins-Minerals (ICAPS) CAPS Take by mouth      TURMERIC PO Take 1 tablet by mouth daily      CINNAMON PO Take by mouth daily      Multiple Vitamin (MULTI VITAMIN DAILY PO) Take by mouth daily      Calcium Citrate-Vitamin D (CALCIUM + D PO) Take by mouth daily         Past Medical History:   Diagnosis Date    [facility]erv[facility] ([facility] [address]     Dysuria     Edema     Female bladder prolapse     Hernia, hiatal     Insomnia     Lipoma of neck     Lumbago     Nodule of kidney     Osteoarthritis     Plantar fasciitis     [facility]VD [facility] vascular disease) ([facility])     Sinusitis     Varicose veins of both lower extremities 2014    Dr Rosalio Dai        Review of Systems   Constitutional: Had chills, No fever and unexpected weight change. HENT: Negative for sore throat and congestion. Eyes: Negative for pain and itching. Respiratory: Has cough and No shortness of breath. Cardiovascular: Negative for chest pain   Gastrointestinal: Negative for abdominal pain, diarrhea, nausea and vomiting. Genitourinary: Negative for dysuria. Musculoskeletal: Negative for leg swelling. Skin: Negative for rash. Neurological: No headache or dizziness. Psychiatric/Behavioral: Negative for dysphoric mood, sleep disturbance and suicidal ideas.         Objective:      /70 (Site: Left Arm, Position: Sitting, Cuff Size: Medium Adult)   Pulse 77   Temp 98.1 °F (36.7 °C) (Oral)   Resp 16   Ht 5' 3.39\" (1.61 m)   Wt 139 lb 12.8 oz (63.4 kg)   SpO2 97%   BMI 24.46 kg/m²    BP Readings from Last 3

## 2018-09-17 ENCOUNTER — OFFICE VISIT (OUTPATIENT)
Dept: FAMILY MEDICINE CLINIC | Age: 78
End: 2018-09-17

## 2018-09-17 VITALS
OXYGEN SATURATION: 97 % | WEIGHT: 139.4 LBS | DIASTOLIC BLOOD PRESSURE: 72 MMHG | HEIGHT: 63 IN | SYSTOLIC BLOOD PRESSURE: 124 MMHG | RESPIRATION RATE: 14 BRPM | BODY MASS INDEX: 24.7 KG/M2 | HEART RATE: 72 BPM

## 2018-09-17 DIAGNOSIS — R53.83 FATIGUE, UNSPECIFIED TYPE: ICD-10-CM

## 2018-09-17 DIAGNOSIS — F51.02 ADJUSTMENT INSOMNIA: ICD-10-CM

## 2018-09-17 DIAGNOSIS — Z13.31 POSITIVE DEPRESSION SCREENING: ICD-10-CM

## 2018-09-17 DIAGNOSIS — F32.9 REACTIVE DEPRESSION: Primary | ICD-10-CM

## 2018-09-17 LAB
A/G RATIO: 1.9 (ref 1.1–2.2)
ALBUMIN SERPL-MCNC: 4.1 G/DL (ref 3.4–5)
ALP BLD-CCNC: 60 U/L (ref 40–129)
ALT SERPL-CCNC: 14 U/L (ref 10–40)
ANION GAP SERPL CALCULATED.3IONS-SCNC: 10 MMOL/L (ref 3–16)
AST SERPL-CCNC: 16 U/L (ref 15–37)
BASOPHILS ABSOLUTE: 0 K/UL (ref 0–0.2)
BASOPHILS RELATIVE PERCENT: 0.8 %
BILIRUB SERPL-MCNC: 0.4 MG/DL (ref 0–1)
BUN BLDV-MCNC: 14 MG/DL (ref 7–20)
CALCIUM SERPL-MCNC: 9.4 MG/DL (ref 8.3–10.6)
CHLORIDE BLD-SCNC: 106 MMOL/L (ref 99–110)
CO2: 27 MMOL/L (ref 21–32)
CREAT SERPL-MCNC: 0.6 MG/DL (ref 0.6–1.2)
EOSINOPHILS ABSOLUTE: 0.1 K/UL (ref 0–0.6)
EOSINOPHILS RELATIVE PERCENT: 1.6 %
GFR AFRICAN AMERICAN: >60
GFR NON-AFRICAN AMERICAN: >60
GLOBULIN: 2.2 G/DL
GLUCOSE FASTING: 82 MG/DL (ref 70–99)
HCT VFR BLD CALC: 43.5 % (ref 36–48)
HEMOGLOBIN: 14 G/DL (ref 12–16)
LYMPHOCYTES ABSOLUTE: 1.6 K/UL (ref 1–5.1)
LYMPHOCYTES RELATIVE PERCENT: 30 %
MCH RBC QN AUTO: 26.8 PG (ref 26–34)
MCHC RBC AUTO-ENTMCNC: 32.2 G/DL (ref 31–36)
MCV RBC AUTO: 83.1 FL (ref 80–100)
MONOCYTES ABSOLUTE: 0.6 K/UL (ref 0–1.3)
MONOCYTES RELATIVE PERCENT: 10.7 %
NEUTROPHILS ABSOLUTE: 3 K/UL (ref 1.7–7.7)
NEUTROPHILS RELATIVE PERCENT: 56.9 %
PDW BLD-RTO: 13.7 % (ref 12.4–15.4)
PLATELET # BLD: 207 K/UL (ref 135–450)
PMV BLD AUTO: 7.9 FL (ref 5–10.5)
POTASSIUM SERPL-SCNC: 4.4 MMOL/L (ref 3.5–5.1)
RBC # BLD: 5.24 M/UL (ref 4–5.2)
SODIUM BLD-SCNC: 143 MMOL/L (ref 136–145)
TOTAL PROTEIN: 6.3 G/DL (ref 6.4–8.2)
TSH REFLEX: 1.54 UIU/ML (ref 0.27–4.2)
WBC # BLD: 5.2 K/UL (ref 4–11)

## 2018-09-17 PROCEDURE — 4040F PNEUMOC VAC/ADMIN/RCVD: CPT | Performed by: FAMILY MEDICINE

## 2018-09-17 PROCEDURE — G0444 DEPRESSION SCREEN ANNUAL: HCPCS | Performed by: FAMILY MEDICINE

## 2018-09-17 PROCEDURE — 1036F TOBACCO NON-USER: CPT | Performed by: FAMILY MEDICINE

## 2018-09-17 PROCEDURE — 1101F PT FALLS ASSESS-DOCD LE1/YR: CPT | Performed by: FAMILY MEDICINE

## 2018-09-17 PROCEDURE — G8400 PT W/DXA NO RESULTS DOC: HCPCS | Performed by: FAMILY MEDICINE

## 2018-09-17 PROCEDURE — 99214 OFFICE O/P EST MOD 30 MIN: CPT | Performed by: FAMILY MEDICINE

## 2018-09-17 PROCEDURE — 1123F ACP DISCUSS/DSCN MKR DOCD: CPT | Performed by: FAMILY MEDICINE

## 2018-09-17 PROCEDURE — G8431 POS CLIN DEPRES SCRN F/U DOC: HCPCS | Performed by: FAMILY MEDICINE

## 2018-09-17 PROCEDURE — G8427 DOCREV CUR MEDS BY ELIG CLIN: HCPCS | Performed by: FAMILY MEDICINE

## 2018-09-17 PROCEDURE — 1090F PRES/ABSN URINE INCON ASSESS: CPT | Performed by: FAMILY MEDICINE

## 2018-09-17 PROCEDURE — G8420 CALC BMI NORM PARAMETERS: HCPCS | Performed by: FAMILY MEDICINE

## 2018-09-17 PROCEDURE — 36415 COLL VENOUS BLD VENIPUNCTURE: CPT | Performed by: FAMILY MEDICINE

## 2018-09-17 RX ORDER — ZOLPIDEM TARTRATE 10 MG/1
10 TABLET ORAL NIGHTLY PRN
Qty: 30 TABLET | Refills: 1 | Status: SHIPPED | OUTPATIENT
Start: 2018-09-17 | End: 2019-02-27 | Stop reason: SDUPTHER

## 2018-09-17 ASSESSMENT — PATIENT HEALTH QUESTIONNAIRE - PHQ9
10. IF YOU CHECKED OFF ANY PROBLEMS, HOW DIFFICULT HAVE THESE PROBLEMS MADE IT FOR YOU TO DO YOUR WORK, TAKE CARE OF THINGS AT HOME, OR GET ALONG WITH OTHER PEOPLE: 1
4. FEELING TIRED OR HAVING LITTLE ENERGY: 3
SUM OF ALL RESPONSES TO PHQ QUESTIONS 1-9: 5
SUM OF ALL RESPONSES TO PHQ9 QUESTIONS 1 & 2: 0
9. THOUGHTS THAT YOU WOULD BE BETTER OFF DEAD, OR OF HURTING YOURSELF: 0
5. POOR APPETITE OR OVEREATING: 2
2. FEELING DOWN, DEPRESSED OR HOPELESS: 0
7. TROUBLE CONCENTRATING ON THINGS, SUCH AS READING THE NEWSPAPER OR WATCHING TELEVISION: 0
8. MOVING OR SPEAKING SO SLOWLY THAT OTHER PEOPLE COULD HAVE NOTICED. OR THE OPPOSITE, BEING SO FIGETY OR RESTLESS THAT YOU HAVE BEEN MOVING AROUND A LOT MORE THAN USUAL: 0
3. TROUBLE FALLING OR STAYING ASLEEP: 0
6. FEELING BAD ABOUT YOURSELF - OR THAT YOU ARE A FAILURE OR HAVE LET YOURSELF OR YOUR FAMILY DOWN: 0
1. LITTLE INTEREST OR PLEASURE IN DOING THINGS: 0
SUM OF ALL RESPONSES TO PHQ QUESTIONS 1-9: 5

## 2018-09-17 NOTE — PROGRESS NOTES
distress. Head: Normocephalic and atraumatic. Mouth/Throat: Oropharynx is clear and moist. No cervical lymphadenopathy. Cardiovascular: Normal rate, regular rhythm and normal heart sounds. Pulmonary/Chest: Breath sounds normal.   Abdominal: Soft. Bowel sounds are normal.   Musculoskeletal: Limbs intact X 4. Legs are free of edema. Skin: Skin is warm and dry. Psychiatric: Patient has a normal mood and affect. Thought content normal.   Vitals reviewed. Cognition is good. Lab Results   Component Value Date    WBC 5.2 09/17/2018    HGB 14.0 09/17/2018    HCT 43.5 09/17/2018    MCV 83.1 09/17/2018     09/17/2018     Lab Results   Component Value Date     09/17/2018    K 4.4 09/17/2018     09/17/2018    CO2 27 09/17/2018    BUN 14 09/17/2018    CREATININE 0.6 09/17/2018    GLUCOSE 93 09/16/2016    CALCIUM 9.4 09/17/2018    PROT 6.3 (L) 09/17/2018    LABALBU 4.1 09/17/2018    BILITOT 0.4 09/17/2018    ALKPHOS 60 09/17/2018    AST 16 09/17/2018    ALT 14 09/17/2018    LABGLOM >60 09/17/2018    GFRAA >60 09/17/2018    AGRATIO 1.9 09/17/2018    GLOB 2.2 09/17/2018     Results for Saba Dias (MRN [id number]) as of 9/18/2018 09:26   Ref. Range 9/17/2018 10:46   TSH Latest Ref Range: 0.27 - 4.20 uIU/mL 1.54         Today's Point of Care Results: No results found for this visit on 09/17/18. PHQ Scores 9/17/2018 3/14/2018 1/10/2018 1/9/2017   PHQ2 Score 0 0 0 0   PHQ9 Score 5 0 0 0     Interpretation of Total Score Depression Severity: 1-4 = Minimal depression, 5-9 = Mild depression, 10-14 = Moderate depression, 15-19 = Moderately severe depression, 20-27 = Severe depression       Assessment / Plan:       Diagnosis Orders   1. Reactive depression  FLUoxetine (PROZAC) 10 MG capsule   2. Fatigue, unspecified type  Comprehensive Metabolic Panel, Fasting    TSH with Reflex    CBC Auto Differential   3. Adjustment insomnia  zolpidem (AMBIEN) 10 MG tablet   4.  Positive depression screening  Positive Screen for Clinical Depression with a Documented Follow-up Plan       Plan:  Fatigue/Depression: All tests appear normal. Fatigue and lack of motivation most likely mild depression. I spoke to the patient at length about the need to say no to some of the demands that people place on her. She states she is unable to get her own work done because she's called to help others. I also encouraged her to get others to help her with some of her household tasks. The patient verbalized understanding and appreciation of the idea of scheduling her own list on her calendar. On the basis of positive PHQ-9 screening (PHQ-9 Total Score: 5), the following plan was implemented: medication prescribed: Prozac- 10 mg 3 times a week. The medication was discussed at length with the patient. She will call for any significant medication side effects or worsening symptoms of depression. Patient will follow-up in 3 week(s) with PCP. Insomnia: Continue low-dose Ambien. Restrictions: None, patient retired      Dwayne Roman received counseling on the following healthy behaviors: nutrition, medication adherence and taking time for herself. Discussed use, benefit, and side effects of prescribed medications as prescribed above. Patient instructed to notify our office if side effects develop from the medications. Barriers to medication compliance addressed. All patient questions answered. Pt voiced understanding. Patient will return in 2-3 weeks for recheck of new medication. Return for recheck sleep in 3 months. Please note this report has been partially produced using speech recognition software and may contain errors related to that system including errors in grammar, punctuation, and spelling, as well as words and phrases that may be inappropriate. If there are any questions or concerns please feel free to contact the dictating provider for clarification.

## 2018-09-18 ENCOUNTER — TELEPHONE (OUTPATIENT)
Dept: FAMILY MEDICINE CLINIC | Age: 78
End: 2018-09-18

## 2018-09-18 RX ORDER — FLUOXETINE 10 MG/1
10 CAPSULE ORAL
Qty: 15 CAPSULE | Refills: 5 | Status: SHIPPED | OUTPATIENT
Start: 2018-09-19 | End: 2018-10-03

## 2018-09-18 NOTE — TELEPHONE ENCOUNTER
Please inform the patient that her lab results are normal. Her liver, kidneys, and thyroid are normal and so is her blood count. She does not have anemia. I am ordering the medications, fluoxetine, that we talked about. She should take it every Monday, Wednesday, and Friday. Please schedule her to see me in 3-4 weeks for recheck of the new medication. We can review the results at that visit if there are any questions.  Thank you, SABIHA

## 2018-10-03 ENCOUNTER — OFFICE VISIT (OUTPATIENT)
Dept: FAMILY MEDICINE CLINIC | Age: 78
End: 2018-10-03
Payer: MEDICARE

## 2018-10-03 VITALS
DIASTOLIC BLOOD PRESSURE: 74 MMHG | SYSTOLIC BLOOD PRESSURE: 102 MMHG | HEART RATE: 70 BPM | TEMPERATURE: 98.3 F | BODY MASS INDEX: 24.57 KG/M2 | WEIGHT: 140.4 LBS | OXYGEN SATURATION: 98 %

## 2018-10-03 DIAGNOSIS — R05.9 COUGH: ICD-10-CM

## 2018-10-03 DIAGNOSIS — J01.10 ACUTE NON-RECURRENT FRONTAL SINUSITIS: Primary | ICD-10-CM

## 2018-10-03 PROCEDURE — 99213 OFFICE O/P EST LOW 20 MIN: CPT | Performed by: NURSE PRACTITIONER

## 2018-10-03 RX ORDER — DOXYCYCLINE 100 MG/1
100 TABLET ORAL 2 TIMES DAILY
Qty: 14 TABLET | Refills: 0 | Status: SHIPPED | OUTPATIENT
Start: 2018-10-03 | End: 2018-10-10

## 2018-10-03 RX ORDER — LATANOPROST 50 UG/ML
SOLUTION/ DROPS OPHTHALMIC
COMMUNITY
Start: 2018-08-30

## 2018-10-08 ENCOUNTER — TELEPHONE (OUTPATIENT)
Dept: FAMILY MEDICINE CLINIC | Age: 78
End: 2018-10-08

## 2018-10-19 ASSESSMENT — ENCOUNTER SYMPTOMS
HEARTBURN: 0
TROUBLE SWALLOWING: 0
SINUS COMPLAINT: 1
VOMITING: 0
CHEST TIGHTNESS: 0
DIARRHEA: 0
SWOLLEN GLANDS: 0
NAUSEA: 0
WHEEZING: 1
COUGH: 1
EYES NEGATIVE: 1
ABDOMINAL PAIN: 0
RHINORRHEA: 1
HOARSE VOICE: 0
SORE THROAT: 0
SINUS PRESSURE: 1
HEMOPTYSIS: 0
SHORTNESS OF BREATH: 0

## 2018-10-31 ENCOUNTER — OFFICE VISIT (OUTPATIENT)
Dept: FAMILY MEDICINE CLINIC | Age: 78
End: 2018-10-31
Payer: MEDICARE

## 2018-10-31 VITALS
OXYGEN SATURATION: 97 % | DIASTOLIC BLOOD PRESSURE: 76 MMHG | HEIGHT: 63 IN | WEIGHT: 141.6 LBS | RESPIRATION RATE: 14 BRPM | BODY MASS INDEX: 25.09 KG/M2 | HEART RATE: 66 BPM | SYSTOLIC BLOOD PRESSURE: 110 MMHG

## 2018-10-31 DIAGNOSIS — F51.02 ADJUSTMENT INSOMNIA: ICD-10-CM

## 2018-10-31 DIAGNOSIS — F32.9 REACTIVE DEPRESSION: Primary | ICD-10-CM

## 2018-10-31 DIAGNOSIS — J01.10 ACUTE NON-RECURRENT FRONTAL SINUSITIS: ICD-10-CM

## 2018-10-31 PROCEDURE — G8420 CALC BMI NORM PARAMETERS: HCPCS | Performed by: FAMILY MEDICINE

## 2018-10-31 PROCEDURE — 1036F TOBACCO NON-USER: CPT | Performed by: FAMILY MEDICINE

## 2018-10-31 PROCEDURE — 1123F ACP DISCUSS/DSCN MKR DOCD: CPT | Performed by: FAMILY MEDICINE

## 2018-10-31 PROCEDURE — 99213 OFFICE O/P EST LOW 20 MIN: CPT | Performed by: FAMILY MEDICINE

## 2018-10-31 PROCEDURE — 1101F PT FALLS ASSESS-DOCD LE1/YR: CPT | Performed by: FAMILY MEDICINE

## 2018-10-31 PROCEDURE — G8400 PT W/DXA NO RESULTS DOC: HCPCS | Performed by: FAMILY MEDICINE

## 2018-10-31 PROCEDURE — G8427 DOCREV CUR MEDS BY ELIG CLIN: HCPCS | Performed by: FAMILY MEDICINE

## 2018-10-31 PROCEDURE — G8484 FLU IMMUNIZE NO ADMIN: HCPCS | Performed by: FAMILY MEDICINE

## 2018-10-31 PROCEDURE — 4040F PNEUMOC VAC/ADMIN/RCVD: CPT | Performed by: FAMILY MEDICINE

## 2018-10-31 PROCEDURE — 1090F PRES/ABSN URINE INCON ASSESS: CPT | Performed by: FAMILY MEDICINE

## 2018-10-31 NOTE — PROGRESS NOTES
Susan Gomez  1940  [de-identified] y.o. Chief Complaint   Patient presents with    Medication Check     stopped Prozac made her feel jittery   . Subjective:      Dr Emily Bone: Susan Gomez is a [de-identified] y.o. White  woman who presents today for follow up on her chronic medical conditions as noted below and for mild depression:    Patient Active Problem List:     Chronic pain of both knees     Bladder prolapse, female, acquired     Enlarged lymph node in neck     Adjustment insomnia     Osteoporosis of multiple sites without pathological fracture     Moderate aortic regurgitation     History of recurrent UTIs          Patient last seen by me on 9/17/2018 for depression and lack of motivation. Fluoxetine started 10 mg 3 times/week. Seen HCR for sinusitis 10/3/18: doxycycline started if not better. Patient had stopped fluoxetine. Past URI: Took antibiotic, but took 3 weeks to get better  Depression/Lack of Motivation: Took 3 Prozac capsules, but felt jittery. Is thankful for advice about making appointments to do the things she needs to get done. She is feeling much better even without medication. Still taking care of her 1year-old granddaughter 8 hours 3 times a week. States she is happy that this little girl keeps her busy. Saw Cardiologist, Dr. Jaquelin Jiménez, on Monday: Will be doing nuclear stress test and ECHO for \"leaky valves\". Patient denies any exertional chest pain, dyspnea, palpitations, syncope, orthopnea, edema or paroxysmal nocturnal dyspnea. Sleep: doing very well on Ambien. The patient  reports that she has never smoked. She has never used smokeless tobacco.. The patient also reports that she does not drink alcohol. Allergies   Allergen Reactions    Prevnar 13 [Pneumococcal 13-Latisha Conj Vacc] Rash     Swelling of injection site and upper arm for more than 7 days. No infection.  Amoxicillin Rash    Flexeril [Cyclobenzaprine Hcl] Nausea And Vomiting       Medications:   On presentation AND new

## 2019-02-19 ENCOUNTER — TELEPHONE (OUTPATIENT)
Dept: FAMILY MEDICINE CLINIC | Age: 79
End: 2019-02-19

## 2019-02-27 ENCOUNTER — TELEPHONE (OUTPATIENT)
Dept: GASTROENTEROLOGY | Age: 79
End: 2019-02-27

## 2019-02-27 ENCOUNTER — OFFICE VISIT (OUTPATIENT)
Dept: FAMILY MEDICINE CLINIC | Age: 79
End: 2019-02-27
Payer: MEDICARE

## 2019-02-27 VITALS
HEIGHT: 63 IN | SYSTOLIC BLOOD PRESSURE: 110 MMHG | RESPIRATION RATE: 12 BRPM | HEART RATE: 63 BPM | WEIGHT: 136.6 LBS | DIASTOLIC BLOOD PRESSURE: 68 MMHG | OXYGEN SATURATION: 100 % | BODY MASS INDEX: 24.2 KG/M2

## 2019-02-27 DIAGNOSIS — R47.02 DYSPHASIA: Primary | ICD-10-CM

## 2019-02-27 DIAGNOSIS — F51.02 ADJUSTMENT INSOMNIA: ICD-10-CM

## 2019-02-27 PROCEDURE — 1036F TOBACCO NON-USER: CPT | Performed by: FAMILY MEDICINE

## 2019-02-27 PROCEDURE — 99213 OFFICE O/P EST LOW 20 MIN: CPT | Performed by: FAMILY MEDICINE

## 2019-02-27 PROCEDURE — 1123F ACP DISCUSS/DSCN MKR DOCD: CPT | Performed by: FAMILY MEDICINE

## 2019-02-27 PROCEDURE — G8427 DOCREV CUR MEDS BY ELIG CLIN: HCPCS | Performed by: FAMILY MEDICINE

## 2019-02-27 PROCEDURE — G8420 CALC BMI NORM PARAMETERS: HCPCS | Performed by: FAMILY MEDICINE

## 2019-02-27 PROCEDURE — 1090F PRES/ABSN URINE INCON ASSESS: CPT | Performed by: FAMILY MEDICINE

## 2019-02-27 PROCEDURE — G8400 PT W/DXA NO RESULTS DOC: HCPCS | Performed by: FAMILY MEDICINE

## 2019-02-27 PROCEDURE — G8484 FLU IMMUNIZE NO ADMIN: HCPCS | Performed by: FAMILY MEDICINE

## 2019-02-27 PROCEDURE — 1101F PT FALLS ASSESS-DOCD LE1/YR: CPT | Performed by: FAMILY MEDICINE

## 2019-02-27 PROCEDURE — 4040F PNEUMOC VAC/ADMIN/RCVD: CPT | Performed by: FAMILY MEDICINE

## 2019-02-27 RX ORDER — ZOLPIDEM TARTRATE 10 MG/1
10 TABLET ORAL NIGHTLY PRN
Qty: 30 TABLET | Refills: 1 | Status: SHIPPED | OUTPATIENT
Start: 2019-02-27 | End: 2019-04-28

## 2019-02-27 RX ORDER — ZOLPIDEM TARTRATE 10 MG/1
TABLET ORAL NIGHTLY PRN
COMMUNITY
End: 2019-02-27 | Stop reason: SDUPTHER

## 2019-02-27 ASSESSMENT — PATIENT HEALTH QUESTIONNAIRE - PHQ9
SUM OF ALL RESPONSES TO PHQ9 QUESTIONS 1 & 2: 0
2. FEELING DOWN, DEPRESSED OR HOPELESS: 0
1. LITTLE INTEREST OR PLEASURE IN DOING THINGS: 0
SUM OF ALL RESPONSES TO PHQ QUESTIONS 1-9: 0
SUM OF ALL RESPONSES TO PHQ QUESTIONS 1-9: 0

## 2019-03-01 PROBLEM — R47.02 DYSPHASIA: Status: ACTIVE | Noted: 2019-03-01

## 2019-03-01 RX ORDER — OMEPRAZOLE 40 MG/1
40 CAPSULE, DELAYED RELEASE ORAL DAILY
Qty: 30 CAPSULE | Refills: 3 | Status: SHIPPED | OUTPATIENT
Start: 2019-03-01 | End: 2019-04-10

## 2019-03-04 ENCOUNTER — TELEPHONE (OUTPATIENT)
Dept: FAMILY MEDICINE CLINIC | Age: 79
End: 2019-03-04

## 2019-03-22 ENCOUNTER — TELEPHONE (OUTPATIENT)
Dept: GASTROENTEROLOGY | Age: 79
End: 2019-03-22

## 2019-04-10 ENCOUNTER — HOSPITAL ENCOUNTER (OUTPATIENT)
Dept: WOMENS IMAGING | Age: 79
Discharge: HOME | End: 2019-04-10
Payer: COMMERCIAL

## 2019-04-10 ENCOUNTER — OFFICE VISIT (OUTPATIENT)
Dept: FAMILY MEDICINE CLINIC | Age: 79
End: 2019-04-10
Payer: MEDICARE

## 2019-04-10 VITALS
WEIGHT: 137.2 LBS | TEMPERATURE: 98.3 F | OXYGEN SATURATION: 99 % | SYSTOLIC BLOOD PRESSURE: 124 MMHG | HEART RATE: 79 BPM | DIASTOLIC BLOOD PRESSURE: 82 MMHG | BODY MASS INDEX: 24.01 KG/M2

## 2019-04-10 DIAGNOSIS — Z12.31 VISIT FOR SCREENING MAMMOGRAM: ICD-10-CM

## 2019-04-10 DIAGNOSIS — J01.00 ACUTE NON-RECURRENT MAXILLARY SINUSITIS: Primary | ICD-10-CM

## 2019-04-10 PROCEDURE — 1123F ACP DISCUSS/DSCN MKR DOCD: CPT | Performed by: NURSE PRACTITIONER

## 2019-04-10 PROCEDURE — 99213 OFFICE O/P EST LOW 20 MIN: CPT | Performed by: NURSE PRACTITIONER

## 2019-04-10 PROCEDURE — 4040F PNEUMOC VAC/ADMIN/RCVD: CPT | Performed by: NURSE PRACTITIONER

## 2019-04-10 PROCEDURE — G8427 DOCREV CUR MEDS BY ELIG CLIN: HCPCS | Performed by: NURSE PRACTITIONER

## 2019-04-10 PROCEDURE — 1036F TOBACCO NON-USER: CPT | Performed by: NURSE PRACTITIONER

## 2019-04-10 PROCEDURE — 1090F PRES/ABSN URINE INCON ASSESS: CPT | Performed by: NURSE PRACTITIONER

## 2019-04-10 PROCEDURE — G8400 PT W/DXA NO RESULTS DOC: HCPCS | Performed by: NURSE PRACTITIONER

## 2019-04-10 PROCEDURE — G8420 CALC BMI NORM PARAMETERS: HCPCS | Performed by: NURSE PRACTITIONER

## 2019-04-10 RX ORDER — DOXYCYCLINE HYCLATE 100 MG
100 TABLET ORAL 2 TIMES DAILY
Qty: 14 TABLET | Refills: 0 | Status: SHIPPED | OUTPATIENT
Start: 2019-04-10 | End: 2019-04-17

## 2019-04-10 ASSESSMENT — ENCOUNTER SYMPTOMS
SHORTNESS OF BREATH: 0
SINUS PRESSURE: 1
COUGH: 0
SINUS PAIN: 1
SORE THROAT: 1
SINUS COMPLAINT: 1

## 2019-04-10 NOTE — PROGRESS NOTES
Procedure Laterality Date    BLADDER REPAIR  2005    HERNIA REPAIR      HYSTERECTOMY, TOTAL ABDOMINAL  10/10/2015    cervix ca-adenoid basal epithelioma    KNEE ARTHROSCOPY Left 04/07/2014    Dr Ila Robert     Family History   Problem Relation Age of Onset    Diabetes Mother     Colon Cancer Mother     Heart Failure Father     Cancer Sister         tonsil, galbladder    Diabetes Sister     Heart Failure Sister     Hypertension Sister    [place]geron Migraines Sister     Diabetes Brother     Heart Failure Brother     Cancer Other         several aunts and uncles    Breast Cancer Brother      Social History     Socioeconomic History    Marital status:       Spouse name: Not on file    Number of children: Not on file    Years of education: Not on file    Highest education level: Not on file   Occupational History    Not on file   Social Needs    Financial resource strain: Not on file    Food insecurity:     Worry: Not on file     Inability: Not on file    Transportation needs:     Medical: Not on file     Non-medical: Not on file   Tobacco Use    Smoking status: Never Smoker    Smokeless tobacco: Never Used   Substance and Sexual Activity    Alcohol use: No    Drug use: No    Sexual activity: Never   Lifestyle    Physical activity:     Days per week: Not on file     Minutes per session: Not on file    Stress: Not on file   Relationships    Social connections:     Talks on phone: Not on file     Gets together: Not on file     Attends Druze service: Not on file     Active member of club or organization: Not on file     Attends meetings of clubs or organizations: Not on file     Relationship status: Not on file    Intimate partner violence:     Fear of current or ex partner: Not on file     Emotionally abused: Not on file     Physically abused: Not on file     Forced sexual activity: Not on file   Other Topics Concern    Not on file   Social History Narrative    Not on file       Review of Systems   Constitutional: Positive for chills. Negative for activity change, appetite change, diaphoresis, fatigue and fever. HENT: Positive for sinus pressure, sinus pain and sore throat. Negative for sneezing. Respiratory: Negative for cough and shortness of breath. Cardiovascular: Negative for chest pain and palpitations. Neurological: Negative for dizziness, light-headedness and headaches. OBJECTIVE:     /82 (Site: Right Upper Arm, Position: Sitting, Cuff Size: Medium Adult)   Pulse 79   Temp 98.3 °F (36.8 °C)   Wt 137 lb 3.2 oz (62.2 kg)   SpO2 99%   BMI 24.01 kg/m²     Physical Exam   Constitutional: She is oriented to person, place, and time. She appears well-developed and well-nourished. No distress. HENT:   Head: Normocephalic and atraumatic. Right Ear: External ear normal. A middle ear effusion is present. Left Ear: External ear normal.   Nose: Mucosal edema (right) present. No rhinorrhea. Right sinus exhibits maxillary sinus tenderness. Right sinus exhibits no frontal sinus tenderness. Left sinus exhibits no maxillary sinus tenderness and no frontal sinus tenderness. Mouth/Throat: Uvula is midline, oropharynx is clear and moist and mucous membranes are normal.   Eyes: Pupils are equal, round, and reactive to light. Conjunctivae and EOM are normal.   Neck: Normal range of motion. Neck supple. Cardiovascular: Normal rate, regular rhythm and normal heart sounds. Pulmonary/Chest: Effort normal and breath sounds normal.   Abdominal: Soft. Musculoskeletal: Normal range of motion. Neurological: She is alert and oriented to person, place, and time. Skin: Skin is warm and dry. She is not diaphoretic. Psychiatric: She has a normal mood and affect. Her behavior is normal. Judgment and thought content normal.   Vitals reviewed. No results found for requested labs within last 30 days.      Microscopic Examination (no units)   Date Value   12/06/2017 YES     LDL

## 2019-04-10 NOTE — PATIENT INSTRUCTIONS
Fluids, rest  Warm salt gargles  1 tsp honey every morning to help with throat discomfort  Take prescribed medication as directed  Return to clinic if no improvement  Keep appointment with PCP  Verbalized understanding and agreement with plan

## 2019-04-22 ENCOUNTER — OFFICE VISIT (OUTPATIENT)
Dept: FAMILY MEDICINE CLINIC | Age: 79
End: 2019-04-22
Payer: MEDICARE

## 2019-04-22 VITALS
OXYGEN SATURATION: 97 % | DIASTOLIC BLOOD PRESSURE: 70 MMHG | SYSTOLIC BLOOD PRESSURE: 108 MMHG | BODY MASS INDEX: 23.41 KG/M2 | TEMPERATURE: 97.6 F | HEART RATE: 80 BPM | WEIGHT: 133.8 LBS

## 2019-04-22 DIAGNOSIS — J01.90 ACUTE BACTERIAL SINUSITIS: Primary | ICD-10-CM

## 2019-04-22 DIAGNOSIS — B96.89 ACUTE BACTERIAL SINUSITIS: Primary | ICD-10-CM

## 2019-04-22 DIAGNOSIS — J02.9 PHARYNGITIS, UNSPECIFIED ETIOLOGY: ICD-10-CM

## 2019-04-22 DIAGNOSIS — J40 BRONCHITIS: ICD-10-CM

## 2019-04-22 PROCEDURE — G8427 DOCREV CUR MEDS BY ELIG CLIN: HCPCS | Performed by: FAMILY MEDICINE

## 2019-04-22 PROCEDURE — 1090F PRES/ABSN URINE INCON ASSESS: CPT | Performed by: FAMILY MEDICINE

## 2019-04-22 PROCEDURE — 1123F ACP DISCUSS/DSCN MKR DOCD: CPT | Performed by: FAMILY MEDICINE

## 2019-04-22 PROCEDURE — G8400 PT W/DXA NO RESULTS DOC: HCPCS | Performed by: FAMILY MEDICINE

## 2019-04-22 PROCEDURE — 99213 OFFICE O/P EST LOW 20 MIN: CPT | Performed by: FAMILY MEDICINE

## 2019-04-22 PROCEDURE — 1036F TOBACCO NON-USER: CPT | Performed by: FAMILY MEDICINE

## 2019-04-22 PROCEDURE — G8420 CALC BMI NORM PARAMETERS: HCPCS | Performed by: FAMILY MEDICINE

## 2019-04-22 PROCEDURE — 96372 THER/PROPH/DIAG INJ SC/IM: CPT | Performed by: FAMILY MEDICINE

## 2019-04-22 PROCEDURE — 4040F PNEUMOC VAC/ADMIN/RCVD: CPT | Performed by: FAMILY MEDICINE

## 2019-04-22 RX ORDER — LEVOFLOXACIN 250 MG/1
250 TABLET ORAL DAILY
Qty: 10 TABLET | Refills: 0 | Status: SHIPPED | OUTPATIENT
Start: 2019-04-22 | End: 2019-05-02

## 2019-04-22 RX ORDER — DEXAMETHASONE SODIUM PHOSPHATE 4 MG/ML
6 INJECTION, SOLUTION INTRA-ARTICULAR; INTRALESIONAL; INTRAMUSCULAR; INTRAVENOUS; SOFT TISSUE ONCE
Status: COMPLETED | OUTPATIENT
Start: 2019-04-22 | End: 2019-04-22

## 2019-04-22 RX ADMIN — DEXAMETHASONE SODIUM PHOSPHATE 6 MG: 4 INJECTION, SOLUTION INTRA-ARTICULAR; INTRALESIONAL; INTRAMUSCULAR; INTRAVENOUS; SOFT TISSUE at 17:06

## 2019-04-22 ASSESSMENT — PATIENT HEALTH QUESTIONNAIRE - PHQ9
SUM OF ALL RESPONSES TO PHQ QUESTIONS 1-9: 0
2. FEELING DOWN, DEPRESSED OR HOPELESS: 0
SUM OF ALL RESPONSES TO PHQ9 QUESTIONS 1 & 2: 0
1. LITTLE INTEREST OR PLEASURE IN DOING THINGS: 0
SUM OF ALL RESPONSES TO PHQ QUESTIONS 1-9: 0

## 2019-04-22 NOTE — PROGRESS NOTES
Chief Complaint   Patient presents with    Pharyngitis     patient is being seen today for a sore throat    Cough     patient states she is having a cough        [place] NARRATIVE:    Started almost 1 1/2 weeks ago with throat then head congestion and now chest.  [place]aving [place] 2 weeks for [place]. No hx asthma but does have hx of recurrent sinusitis. Using sudafed and mucinex liquid and neti pot. Patient is established unless otherwise noted. Above chief complaint and [place] obtained by this physician provider. Review of Systems   Constitutional: Positive for fatigue. Negative for chills and fever. HENT: Positive for congestion, sinus pressure and sore throat. Respiratory: Positive for cough. Negative for shortness of breath. Gastrointestinal: Negative for diarrhea and nausea. Genitourinary: Negative for dysuria. Neurological: Positive for headaches. Allergies   Allergen Reactions    Prevnar 13 [Pneumococcal 13-Latisha Conj Vacc] Rash     Swelling of injection site and upper arm for more than 7 days. No infection.  Amoxicillin Rash    Flexeril [Cyclobenzaprine Hcl] Nausea And Vomiting     Allergy historyupdated. Outpatient Medications Marked as Taking for the 4/22/19 encounter (Office Visit) with Denise Knight MD   Medication Sig Dispense Refill    levofloxacin (LEVAQUIN) 250 MG tablet Take 1 tablet by mouth daily for 10 days (hold calcium while taking) 10 tablet 0    zolpidem (AMBIEN) 10 MG tablet Take 1 tablet by mouth nightly as needed for Sleep for up to 60 days. . 30 tablet 1    latanoprost (XALATAN) 0.005 % ophthalmic solution       vitamin B-12 (CYANOCOBALAMIN) 100 MCG tablet Take 50 mcg by mouth daily      Ascorbic Acid (VITAMIN C) 500 MG tablet Take 500 mg by mouth daily      CINNAMON PO Take by mouth daily      Multiple Vitamin (MULTI VITAMIN DAILY PO) Take by mouth daily      Calcium Citrate-Vitamin D (CALCIUM + D PO) Take by mouth daily         Tobacco use history updated. (LEVAQUIN) 250 MG tablet; Take 1 tablet by mouth daily for 10 days (hold calcium while taking)  -     dexamethasone (DECADRON) injection 6 mg    Pharyngitis, unspecified etiology  -     levofloxacin (LEVAQUIN) 250 MG tablet; Take 1 tablet by mouth daily for 10 days (hold calcium while taking)      Problems listed above are stable and therapeutic plan is unchanged unless otherwise specified. See orders above and comments below for details of workup ormedication orders. _________________________________________________  Plan:     Return if symptoms worsen or fail to improve.       Electronically signed by Fauzia Leon MD on 4/22/19 at 4:46 PM

## 2019-04-22 NOTE — PATIENT INSTRUCTIONS
If head is still stuffy before flying then use sudafed again AND get 12-Hour Afrin and use it before boarding plane.

## 2019-04-28 ASSESSMENT — ENCOUNTER SYMPTOMS
DIARRHEA: 0
COUGH: 1
NAUSEA: 0
SORE THROAT: 1
SINUS PRESSURE: 1
SHORTNESS OF BREATH: 0

## 2019-05-20 ENCOUNTER — OFFICE VISIT (OUTPATIENT)
Dept: FAMILY MEDICINE CLINIC | Age: 79
End: 2019-05-20

## 2019-05-20 VITALS
WEIGHT: 134.2 LBS | SYSTOLIC BLOOD PRESSURE: 128 MMHG | BODY MASS INDEX: 23.78 KG/M2 | OXYGEN SATURATION: 96 % | HEART RATE: 73 BPM | HEIGHT: 63 IN | DIASTOLIC BLOOD PRESSURE: 78 MMHG

## 2019-05-20 DIAGNOSIS — Z51.81 MEDICATION MONITORING ENCOUNTER: ICD-10-CM

## 2019-05-20 DIAGNOSIS — Z00.00 ROUTINE GENERAL MEDICAL EXAMINATION AT A HEALTH CARE FACILITY: Primary | ICD-10-CM

## 2019-05-20 DIAGNOSIS — R53.83 FATIGUE, UNSPECIFIED TYPE: ICD-10-CM

## 2019-05-20 DIAGNOSIS — F51.02 ADJUSTMENT INSOMNIA: ICD-10-CM

## 2019-05-20 RX ORDER — ZOLPIDEM TARTRATE 10 MG/1
10 TABLET ORAL NIGHTLY PRN
COMMUNITY
End: 2019-06-25 | Stop reason: DRUGHIGH

## 2019-05-20 ASSESSMENT — LIFESTYLE VARIABLES: HOW OFTEN DO YOU HAVE A DRINK CONTAINING ALCOHOL: 0

## 2019-05-20 ASSESSMENT — PATIENT HEALTH QUESTIONNAIRE - PHQ9
SUM OF ALL RESPONSES TO PHQ QUESTIONS 1-9: 0
SUM OF ALL RESPONSES TO PHQ QUESTIONS 1-9: 0

## 2019-05-20 ASSESSMENT — ANXIETY QUESTIONNAIRES: GAD7 TOTAL SCORE: 0

## 2019-05-20 NOTE — PATIENT INSTRUCTIONS
Advance Directives: Care Instructions  Your Care Instructions  An advance directive is a legal way to state your wishes at the end of your life. It tells your family and your doctor what to do if you can no longer say what you want. There are two main types of advance directives. You can change them any time that your wishes change. · A living will tells your family and your doctor your wishes about life support and other treatment. · A durable power of  for health care lets you name a person to make treatment decisions for you when you can't speak for yourself. This person is called a health care agent. If you do not have an advance directive, decisions about your medical care may be made by a doctor or a  who doesn't know you. It may help to think of an advance directive as a gift to the people who care for you. If you have one, they won't have to make tough decisions by themselves. Follow-up care is a key part of your treatment and safety. Be sure to make and go to all appointments, and call your doctor if you are having problems. It's also a good idea to know your test results and keep a list of the medicines you take. How can you care for yourself at home? · Discuss your wishes with your loved ones and your doctor. This way, there are no surprises. · Many states have a unique form. Or you might use a universal form that has been approved by many states. This kind of form can sometimes be completed and stored online. Your electronic copy will then be available wherever you have a connection to the Internet. In most cases, doctors will respect your wishes even if you have a form from a different state. · You don't need a  to do an advance directive. But you may want to get legal advice. · Think about these questions when you prepare an advance directive:  ? Who do you want to make decisions about your medical care if you are not able to?  Many people choose a family member or close friend. ? Do you know enough about life support methods that might be used? If not, talk to your doctor so you understand. ? What are you most afraid of that might happen? You might be afraid of having pain, losing your independence, or being kept alive by machines. ? Where would you prefer to die? Choices include your home, a hospital, or a nursing home. ? Would you like to have information about hospice care to support you and your family? ? Do you want to donate organs when you die? ? Do you want certain Congregation practices performed before you die? If so, put your wishes in the advance directive. · Read your advance directive every year, and make changes as needed. When should you call for help? Be sure to contact your doctor if you have any questions. Where can you learn more? Go to [email or web address]://chmateb.[email or web address] org and sign in to your Digital Vega account. Enter R264 in the Mosa Records box to learn more about \"Advance Directives: Care Instructions. \"     If you do not have an account, please click on the \"Sign Up Now\" link. Current as of: April 18, 2018  Content Version: 12.0  © 9720-6619 Healthwise, [employer]. Care instructions adapted under license by [facility] ([facility]). If you have questions about a medical condition or this instruction, always ask your healthcare professional. [address] any warranty or liability for your use of this information. Learning About Durable Power of  for Health Care  What is a durable power of  for health care? A durable power of  for health care is one type of the legal forms called advance directives. It lets you decide who you want to make treatment decisions for you if you cannot speak or decide for yourself. The person you choose is called your health care agent. Another type of advance directive is a living will.  It lets you write down what kinds of treatment or life support you want or do not want. What should you think about when choosing a health care agent? Choose your health care agent carefully. This person may or may not be a family member. Talk to the person before you make your final decision. Make sure he or she is comfortable with this responsibility. It's a good idea to choose someone who:  · Is at least 25years old. · Knows you well and understands what makes life meaningful for you. · Understands your Yarsani and moral values. · Will do what you want, not what he or she wants. · Will be able to make difficult choices at a stressful time. · Will be able to refuse or stop treatment, if that is what you would want, even if you could die. · Will be firm and confident with health professionals if needed. · Will ask questions to get necessary information. · Lives near you or agrees to travel to you if needed. Your family may help you make medical decisions while you can still be part of that process. But it is important to choose one person to be your health care agent in case you are not able to make decisions for yourself. If you don't fill out the legal form and name a health care agent, the decisions your family can make may be limited. Who will make decisions for you if you do not have a health care agent? If you don't have a health care agent or a living will, your family members may disagree about your medical care. And then some medical professionals who may not know you as well might have to make decisions for you. In some cases, a  makes the decisions. When you name a health care agent, it is very clear who has the power to make health decisions for you. How do you name a health care agent? You name your health care agent on a legal form. It is usually called a durable power of  for health care. Ask your hospital, state bar association, or office on aging where to find these forms.   You must sign the form to make it legal. Some states require you to get the form notarized. This means that a person called a  watches you sign the form and then he or she signs the form. Some states also require that two or more witnesses sign the form. Be sure to tell your family members and doctors who your health care agent is. Keep your forms in a safe place. But make sure that your loved ones know where the forms are. This could be in your desk where you keep other important papers. Make sure your doctor has a copy of your forms. Where can you learn more? Go to https://chpepiceweb.[employer]. org and sign in to your PingThings account. Enter [id number] in the Proteopure box to learn more about \"Learning About Durable Power of  for Health Care. \"     If you do not have an account, please click on the \"Sign Up Now\" link. Current as of: April 18, 2018  Content Version: 12.0  © 7369-8191 [employer]. Care instructions adapted under license by [facility] ([facility]). If you have questions about a medical condition or this instruction, always ask your healthcare professional. [address] any warranty or liability for your use of this information. Learning About Living Vern Foster  What is a living will? A living will is a legal form you use to write down the kind of care you want at the end of your life. It is used by the health professionals who will treat you if you aren't able to decide for yourself. If you put your wishes in writing, your loved ones and others will know what kind of care you want. They won't need to guess. This can ease your mind and be helpful to others. A living will is not the same as an estate or property will. An estate will explains what you want to happen with your money and property after you die. Is a living will a legal document? A living will is a legal document. Each state has its own laws about living gould.  If you move to another state, make sure that your living will is legal in the state where you now live. Or you might use a universal form that has been approved by many states. This kind of form can sometimes be completed and stored online. Your electronic copy will then be available wherever you have a connection to the Internet. In most cases, doctors will respect your wishes even if you have a form from a different state. · You don't need an  to complete a living will. But legal advice can be helpful if your state's laws are unclear, your health history is complicated, or your family can't agree on what should be in your living will. · You can change your living will at any time. Some people find that their wishes about end-of-life care change as their health changes. · In addition to making a living will, think about completing a medical power of  form. This form lets you name the person you want to make end-of-life treatment decisions for you (your \"health care agent\") if you're not able to. Many hospitals and nursing homes will give you the forms you need to complete a living will and a medical power of . · Your living will is used only if you can't make or communicate decisions for yourself anymore. If you become able to make decisions again, you can accept or refuse any treatment, no matter what you wrote in your living will. · Your state may offer an online registry. This is a place where you can store your living will online so the doctors and nurses who need to treat you can find it right away. What should you think about when creating a living will? Talk about your end-of-life wishes with your family members and your doctor. Let them know what you want. That way the people making decisions for you won't be surprised by your choices. Think about these questions as you make your living will:  · Do you know enough about life support methods that might be used?  If not, talk to your doctor so you know what might be done if you can't these benefits include a comprehensive review of your medical history including lifestyle, illnesses that may run in your family, and various assessments and screenings as appropriate. After reviewing your medical record and screening and assessments performed today your provider may have ordered immunizations, labs, imaging, and/or referrals for you. A list of these orders (if applicable) as well as your Preventive Care list are included within your After Visit Summary for your review. Other Preventive Recommendations:    · A preventive eye exam performed by an eye specialist is recommended every 1-2 years to screen for glaucoma; cataracts, macular degeneration, and other eye disorders. · A preventive dental visit is recommended every 6 months. · Try to get at least 150 minutes of exercise per week or 10,000 steps per day on a pedometer . · Order or download the FREE \"Exercise & Physical Activity: Your Everyday Guide\" from [employer]. Call 5-625.600.1047 or search [employer] online. · You need 4777-9810 mg of calcium and 1883-2702 IU of vitamin D per day. It is possible to meet your calcium requirement with diet alone, but a vitamin D supplement is usually necessary to meet this goal.  · When exposed to the sun, use a sunscreen that protects against both UVA and UVB radiation with an SPF of 30 or greater. Reapply every 2 to 3 hours or after sweating, drying off with a towel, or swimming. · Always wear a seat belt when traveling in a car. Always wear a helmet when riding a bicycle or motorcycle.

## 2019-05-20 NOTE — PROGRESS NOTES
Lumbago     Nodule of kidney     Osteoarthritis     Plantar fasciitis     [facility]VD [facility]peripheral vascular disease) ([facility])     Sinusitis     Varicose veins of both lower extremities 2014    Dr Dumas [place]     Past Surgical History:   Procedure Laterality Date    BLADDER REPAIR  2005    HERNIA REPAIR      HYSTERECTOMY, TOTAL ABDOMINAL  10/10/2015    cervix ca-adenoid basal epithelioma    KNEE ARTHROSCOPY Left 04/07/2014    Dr Holden Eng     Family History   Problem Relation Age of Onset    Diabetes Mother     Colon Cancer Mother     Heart Failure Father     Cancer Sister         tonsil, galbladder    Diabetes Sister     Heart Failure Sister     Hypertension Sister    [facility] Migraines Sister     Diabetes Brother     Heart Failure Brother     Cancer Other         several aunts and uncles    Breast Cancer Brother        CareTeam (Including outside providers/suppliers regularly involved in providing care):   Patient Care Team:  Natasha Hannon MD as PCP - [facility] Attributed Provider  Connie Lowe MD as Consulting Physician (Gastroenterology)  Kelley Gil MD as Consulting Physician (Cardiology)    Wt Readings from Last 3 Encounters:   05/20/19 134 lb 3.2 oz (60.9 kg)   04/22/19 133 lb 12.8 oz (60.7 kg)   04/10/19 137 lb 3.2 oz (62.2 kg)     Vitals:    05/20/19 1423   BP: 128/78   Site: Left Upper Arm   Position: Sitting   Cuff Size: Small Adult   Pulse: 73   SpO2: 96%   Weight: 134 lb 3.2 oz (60.9 kg)   Height: 5' 2.5\" (1.588 m)     Body mass index is 24.15 kg/m². Based upon direct observation of the patient, evaluation of cognition reveals recent and remote memory intact. Patient's complete Health Risk Assessment and screening values have been reviewed and are found in Flowsheets. The following problems were reviewed today and where indicated follow up appointments were made and/or referrals ordered.     Positive Risk Factor Screenings with Interventions:     General Health:  General  In general, how would you say your health is?: Good  In the past 7 days, have you experienced any of the following? New or Increased Pain, New or Increased Fatigue, Loneliness, Social Isolation, Stress or Anger?: (!) New or Increased Fatigue  Do you get the social and emotional support that you need?: Yes  Do you have a Living Will?: Yes  General Health Risk Interventions:  · {Medicare AWV General Health Risk Interventions:[id number]}    Health Habits/Nutrition:  Health Habits/Nutrition  Do you exercise for at least 20 minutes 2-3 times per week?: Yes  Have you lost any weight without trying in the past 3 months?: (!) Yes  Do you eat fewer than 2 meals per day?: No  Have you seen a dentist within the past year?: Yes  Body mass index is 24.15 kg/m². Health Habits/Nutrition Interventions:  · {Medicare AWV Health Habits/Nutrition Interventions:[id number]}    Personalized Preventive Plan   Current Health Maintenance Status  Immunization History   Administered Date(s) Administered    Pneumococcal 13-valent Conjugate ([id number]) 07/19/2017    Td, unspecified formulation 12/12/2011        Health Maintenance   Topic Date Due    Shingles Vaccine (1 of 2) 03/24/1988    DTaP/Tdap/Td vaccine (1 - Tdap) 12/13/2011    Flu vaccine (Season Ended) 09/01/2019    DEXA (modify frequency per FRAX score)  Completed     Recommendations for Preventive Services Due: see orders and patient instructions/AVS.  .   Recommended screening schedule for the next 5-10 years is provided to the patient in written form: see Patient Instructions/AVS.

## 2019-05-20 NOTE — PROGRESS NOTES
Chief Complaint   Patient presents with   [facility] Establish Care     patient is here to establish care     Medicare AWV     patient is here for an annual wellness visit        [place] NARRATIVE:    Patient is NEW to this provider today. Above chief complaint and [place] obtained by this physician provider. Review of Systems    Allergies   Allergen Reactions    Prevnar 13 [Pneumococcal 13-Latisha Conj Vacc] Rash     Swelling of injection site and upper arm for more than 7 days. No infection.  Amoxicillin Rash    Flexeril [Cyclobenzaprine Hcl] Nausea And Vomiting     Allergy history updated. Outpatient Medications Marked as Taking for the 5/20/19 encounter (Office Visit) with Tao Pa MD   Medication Sig Dispense Refill    zolpidem (AMBIEN) 10 MG tablet Take 10 mg by mouth nightly as needed for Sleep (states breaks into 3 pieces).  latanoprost (XALATAN) 0.005 % ophthalmic solution       vitamin B-12 (CYANOCOBALAMIN) 100 MCG tablet Take 50 mcg by mouth daily      Ascorbic Acid (VITAMIN C) 500 MG tablet Take 500 mg by mouth daily      CINNAMON PO Take by mouth daily      Multiple Vitamin (MULTI VITAMIN DAILY PO) Take by mouth daily      Calcium Citrate-Vitamin D (CALCIUM + D PO) Take by mouth daily         Medication list reviewed andreconciled by this provider.       Tobacco use:     Past Medical History:   Diagnosis Date    Cervical cancer ([address]     Dysuria     Edema     Female bladder prolapse     Hernia, hiatal     Insomnia     Lipoma of neck     Lumbago     Nodule of kidney     Osteoarthritis     Plantar fasciitis     PVD (peripheral vascular disease) ([address]     Sinusitis     Varicose veins of both lower extremities 2014    Dr Ean Golden     Past Surgical History:   Procedure Laterality Date    BLADDER REPAIR  2005    HERNIA REPAIR      HYSTERECTOMY, TOTAL ABDOMINAL  10/10/2015    cervix ca-adenoid basal epithelioma    KNEE ARTHROSCOPY Left 04/07/2014    Dr Law Wolf     Family History 12.8 oz (60.7 kg)   04/10/19 137 lb 3.2 oz (62.2 kg)     Body mass index is 24.15 kg/m². No results found for this visit on 05/20/19. Physical Exam    Intake paperwork reviewed in detail and scanned to chart. Controlled Substances Monitoring:                          _________________________________________________  Assessment:     Tameka Gomez was seen today for [facility] care and medicare awv. Diagnoses and all orders for this visit:    Routine general medical examination at a health care facility        _________________________________________________  Plan:     Return for Medicare Annual Wellness Visit in 1 year. Encounter note is signedelectronically at date and time of note closure.

## 2019-05-20 NOTE — PROGRESS NOTES
Exam        _________________________________________________  Assessment:     Maria Teresa Lira was seen today for [facility] care and medicare awv. Diagnoses and all orders for this visit:    Routine general medical examination at a health care facility      Problems listed above are stable and therapeutic plan is unchanged unless otherwise specified. See orders above and comments below for details of workup ormedication orders. _________________________________________________  Plan:     Return for Medicare Annual Wellness Visit in 1 year.       Electronically signed by Petey Wyatt MD on 5/20/19 at 3:17 PM

## 2019-06-25 ENCOUNTER — OFFICE VISIT (OUTPATIENT)
Dept: FAMILY MEDICINE CLINIC | Age: 79
End: 2019-06-25
Payer: MEDICARE

## 2019-06-25 VITALS
HEIGHT: 63 IN | BODY MASS INDEX: 23.74 KG/M2 | WEIGHT: 134 LBS | HEART RATE: 84 BPM | DIASTOLIC BLOOD PRESSURE: 86 MMHG | SYSTOLIC BLOOD PRESSURE: 136 MMHG

## 2019-06-25 DIAGNOSIS — Z76.0 MEDICATION REFILL: ICD-10-CM

## 2019-06-25 DIAGNOSIS — M79.604 LEG PAIN, DIFFUSE, RIGHT: Primary | ICD-10-CM

## 2019-06-25 DIAGNOSIS — M81.0 AGE-RELATED OSTEOPOROSIS WITHOUT CURRENT PATHOLOGICAL FRACTURE: ICD-10-CM

## 2019-06-25 DIAGNOSIS — G47.00 INSOMNIA, UNSPECIFIED TYPE: ICD-10-CM

## 2019-06-25 PROCEDURE — G8420 CALC BMI NORM PARAMETERS: HCPCS | Performed by: NURSE PRACTITIONER

## 2019-06-25 PROCEDURE — 4040F PNEUMOC VAC/ADMIN/RCVD: CPT | Performed by: NURSE PRACTITIONER

## 2019-06-25 PROCEDURE — G8400 PT W/DXA NO RESULTS DOC: HCPCS | Performed by: NURSE PRACTITIONER

## 2019-06-25 PROCEDURE — 1090F PRES/ABSN URINE INCON ASSESS: CPT | Performed by: NURSE PRACTITIONER

## 2019-06-25 PROCEDURE — 1036F TOBACCO NON-USER: CPT | Performed by: NURSE PRACTITIONER

## 2019-06-25 PROCEDURE — 1123F ACP DISCUSS/DSCN MKR DOCD: CPT | Performed by: NURSE PRACTITIONER

## 2019-06-25 PROCEDURE — 99213 OFFICE O/P EST LOW 20 MIN: CPT | Performed by: NURSE PRACTITIONER

## 2019-06-25 PROCEDURE — G8427 DOCREV CUR MEDS BY ELIG CLIN: HCPCS | Performed by: NURSE PRACTITIONER

## 2019-06-25 RX ORDER — ZOLPIDEM TARTRATE 5 MG/1
5 TABLET ORAL NIGHTLY PRN
Qty: 10 TABLET | Refills: 0 | Status: SHIPPED | OUTPATIENT
Start: 2019-06-25 | End: 2019-07-05

## 2019-06-25 RX ORDER — ZOLPIDEM TARTRATE 10 MG/1
10 TABLET ORAL NIGHTLY PRN
Status: CANCELLED | OUTPATIENT
Start: 2019-06-25

## 2019-06-25 ASSESSMENT — ENCOUNTER SYMPTOMS
WHEEZING: 0
CHEST TIGHTNESS: 0
COUGH: 0
SHORTNESS OF BREATH: 0

## 2019-06-25 NOTE — PROGRESS NOTES
Calvinkearaminnie Ashwini  1940  78 y.o. SUBJECT KEN[place]:    Chief Complaint   Patient presents with    Leg Pain     right       Jordy Saleh is an 78year old female who is in with complaint of an episode of acute right leg pain. She states the pain occurred 5 days ago, woke her up from a sleep. She states it scared her so much \"I almost called my friend to take me to the ED\". She states she has never had an episode as this in the past. She states she took 8, 81 mg aspirin tablets because of her fear of a blood clot. She also states she has been wearing elastic hose during the day and at bedtime. She states she has had no other leg pain since that episode. She states she was in [facility] about one month ago and while there she did fall (missed a step). She denies injury from the fall. She states she has low back pain but this is not new - was having before the fall. Leg Pain    The incident occurred 5 to 7 days ago. The incident occurred at home. There was no injury mechanism. The pain is present in the right leg. The pain is at a severity of 10/10. The pain is severe. The pain has been constant since onset. Pertinent negatives include no inability to bear weight or loss of sensation. She reports no foreign bodies present. Nothing aggravates the symptoms. She has tried rest for the symptoms. The treatment provided no relief. Current Outpatient Medications on File Prior to Visit   Medication Sig Dispense Refill    latanoprost (XALATAN) 0.005 % ophthalmic solution       vitamin B-12 (CYANOCOBALAMIN) 100 MCG tablet Take 50 mcg by mouth daily      Ascorbic Acid (VITAMIN C) 500 MG tablet Take 500 mg by mouth daily      CINNAMON PO Take by mouth daily      Multiple Vitamin (MULTI VITAMIN DAILY PO) Take by mouth daily      Calcium Citrate-Vitamin D (CALCIUM + D PO) Take by mouth daily       No current facility-administered medications on file prior to visit.         Past Medical History:   Diagnosis Date    Cervical organizations: Not on file     Relationship status: Not on file    Intimate partner violence:     Fear of current or ex partner: Not on file     Emotionally abused: Not on file     Physically abused: Not on file     Forced sexual activity: Not on file   Other Topics Concern    Not on file   Social History Narrative    Not on file       Review of Systems   Constitutional: Negative for activity change, appetite change, chills, diaphoresis, fatigue, fever and unexpected weight change. Respiratory: Negative for cough, chest tightness, shortness of breath and wheezing. Cardiovascular: Negative for chest pain and palpitations. Neurological: Negative for dizziness, weakness, light-headedness and headaches. OBJECTIVE:     /86 (Site: Right Upper Arm, Position: Sitting, Cuff Size: Medium Adult)   Pulse 84   Ht 5' 3\" (1.6 m)   Wt 134 lb (60.8 kg)   BMI 23.74 kg/m²     Physical Exam   Cardiovascular:   Pulses:       Dorsalis pedis pulses are 2+ on the right side, and 2+ on the left side. Posterior tibial pulses are 2+ on the right side, and 2+ on the left side. Musculoskeletal:        Lumbar back: She exhibits normal range of motion, no tenderness, no bony tenderness, no swelling and no pain. Back:    Feet:   Right Foot:   Skin Integrity: Negative for ulcer, blister, skin breakdown, erythema, callus or dry skin. Left Foot:   Skin Integrity: Negative for ulcer, blister, skin breakdown, erythema, callus or dry skin. No results found for requested labs within last 30 days.      Microscopic Examination (no units)   Date Value   12/06/2017 YES     LDL Calculated (mg/dL)   Date Value   06/25/2012 144       Lab Results   Component Value Date    WBC 5.2 09/17/2018    WBC 4.8 09/16/2016    WBC 7.3 06/10/2015    NEUTROABS 3.0 09/17/2018    NEUTROABS 2.5 09/16/2016    NEUTROABS 5.1 06/10/2015    HGB 14.0 09/17/2018    HGB 13.7 09/16/2016    HGB 14.4 06/10/2015    HCT 43.5 09/17/2018    HCT 42.6 09/16/2016    HCT 44.0 06/10/2015    MCV 83.1 09/17/2018    MCV 84 09/16/2016    MCV 84.6 06/10/2015     09/17/2018     09/16/2016     06/10/2015    LYMPHSABS 1.6 09/17/2018    MONOSABS 0.6 09/17/2018    EOSABS 0.1 09/17/2018    BASOSABS 0.0 09/17/2018     Lab Results   Component Value Date    TSH 1.900 06/21/2013     Lab Results   Component Value Date    LABALBU 4.1 09/17/2018    BILITOT 0.4 09/17/2018    AST 16 09/17/2018    ALT 14 09/17/2018    ALKPHOS 60 09/17/2018             No results found for this visit on 06/25/19. ASSESSMENT AND PLAN:     1. Leg pain, diffuse, right  - External Referral To Cardiology    2. Claudication of right lower extremity [facility])  - External Referral To Cardiology    3. Medication refill    4. Insomnia, unspecified type  - refill provided  - zolpidem (AMBIEN) 5 MG tablet; Take 1/2 tablet by mouth nightly as needed for Sleep for up to 10 days. Dispense: 10 tablet; Refill: 0    5. Iliac crest bone pain  - DEXA VERTEBRAL FRACTURE ASSESSMENT; Future    Fluids, rest  Get bone density done  Will see cardiology for right leg pain  Keep appointment with PCP  Verbalized understanding and agreement with plan    No follow-ups on file. Care discussed with patient. Patient educated on signs and symptoms of exacerbation and when to seek further medical attention. Advised to call for any problems, questions, or concerns. Patient verbalizes understanding and agrees with plan. Medications reviewed and reconciled. Continue current medications. Appropriate prescriptions are ordered. Risks and benefits of meds are discussed. After visit summary provided.

## 2019-07-01 ENCOUNTER — HOSPITAL ENCOUNTER (OUTPATIENT)
Dept: WOMENS IMAGING | Age: 79
Discharge: HOME | End: 2019-07-01
Payer: MEDICARE

## 2019-07-01 DIAGNOSIS — M81.0 AGE-RELATED OSTEOPOROSIS WITHOUT CURRENT PATHOLOGICAL FRACTURE: ICD-10-CM

## 2019-07-01 PROCEDURE — 77080 DXA BONE DENSITY AXIAL: CPT

## 2019-07-02 ENCOUNTER — TELEPHONE (OUTPATIENT)
Dept: FAMILY MEDICINE CLINIC | Age: 79
End: 2019-07-02

## 2019-11-19 ENCOUNTER — OFFICE VISIT (OUTPATIENT)
Dept: FAMILY MEDICINE CLINIC | Age: 79
End: 2019-11-19
Payer: MEDICARE

## 2019-11-19 VITALS
HEIGHT: 63 IN | SYSTOLIC BLOOD PRESSURE: 116 MMHG | HEART RATE: 74 BPM | OXYGEN SATURATION: 97 % | WEIGHT: 134.6 LBS | BODY MASS INDEX: 23.85 KG/M2 | DIASTOLIC BLOOD PRESSURE: 80 MMHG | TEMPERATURE: 97.4 F | RESPIRATION RATE: 16 BRPM

## 2019-11-19 DIAGNOSIS — N39.0 URINARY TRACT INFECTION WITHOUT HEMATURIA, SITE UNSPECIFIED: Primary | ICD-10-CM

## 2019-11-19 LAB
BILIRUBIN, POC: NEGATIVE
BLOOD URINE, POC: ABNORMAL
CLARITY, POC: ABNORMAL
COLOR, POC: YELLOW
GLUCOSE URINE, POC: NEGATIVE
KETONES, POC: NEGATIVE
LEUKOCYTE EST, POC: ABNORMAL
NITRITE, POC: NEGATIVE
PH, POC: 5.5
PROTEIN, POC: ABNORMAL
SPECIFIC GRAVITY, POC: 1.03
UROBILINOGEN, POC: ABNORMAL

## 2019-11-19 PROCEDURE — 1123F ACP DISCUSS/DSCN MKR DOCD: CPT | Performed by: NURSE PRACTITIONER

## 2019-11-19 PROCEDURE — G8484 FLU IMMUNIZE NO ADMIN: HCPCS | Performed by: NURSE PRACTITIONER

## 2019-11-19 PROCEDURE — 4040F PNEUMOC VAC/ADMIN/RCVD: CPT | Performed by: NURSE PRACTITIONER

## 2019-11-19 PROCEDURE — 1090F PRES/ABSN URINE INCON ASSESS: CPT | Performed by: NURSE PRACTITIONER

## 2019-11-19 PROCEDURE — 1036F TOBACCO NON-USER: CPT | Performed by: NURSE PRACTITIONER

## 2019-11-19 PROCEDURE — G8427 DOCREV CUR MEDS BY ELIG CLIN: HCPCS | Performed by: NURSE PRACTITIONER

## 2019-11-19 PROCEDURE — 99213 OFFICE O/P EST LOW 20 MIN: CPT | Performed by: NURSE PRACTITIONER

## 2019-11-19 PROCEDURE — 81002 URINALYSIS NONAUTO W/O SCOPE: CPT | Performed by: NURSE PRACTITIONER

## 2019-11-19 PROCEDURE — G8420 CALC BMI NORM PARAMETERS: HCPCS | Performed by: NURSE PRACTITIONER

## 2019-11-19 PROCEDURE — G8399 PT W/DXA RESULTS DOCUMENT: HCPCS | Performed by: NURSE PRACTITIONER

## 2019-11-19 RX ORDER — SULFAMETHOXAZOLE AND TRIMETHOPRIM 800; 160 MG/1; MG/1
1 TABLET ORAL 2 TIMES DAILY
Qty: 14 TABLET | Refills: 0 | Status: SHIPPED | OUTPATIENT
Start: 2019-11-19 | End: 2020-05-18 | Stop reason: SDUPTHER

## 2019-11-19 RX ORDER — PHENAZOPYRIDINE HYDROCHLORIDE 100 MG/1
100 TABLET, FILM COATED ORAL 3 TIMES DAILY PRN
Qty: 30 TABLET | Refills: 0 | Status: SHIPPED | OUTPATIENT
Start: 2019-11-19 | End: 2019-11-29

## 2019-11-19 ASSESSMENT — ENCOUNTER SYMPTOMS
SINUS PRESSURE: 0
COLOR CHANGE: 0
COUGH: 0
NAUSEA: 0
ABDOMINAL PAIN: 1
VOMITING: 0
EYES NEGATIVE: 1
CHEST TIGHTNESS: 0
DIARRHEA: 0
SHORTNESS OF BREATH: 0
APNEA: 0
SINUS PAIN: 0

## 2019-11-22 LAB
ORGANISM: ABNORMAL
URINE CULTURE, ROUTINE: ABNORMAL

## 2019-12-10 ENCOUNTER — OFFICE VISIT (OUTPATIENT)
Dept: FAMILY MEDICINE CLINIC | Age: 79
End: 2019-12-10
Payer: MEDICARE

## 2019-12-10 VITALS
OXYGEN SATURATION: 99 % | SYSTOLIC BLOOD PRESSURE: 122 MMHG | BODY MASS INDEX: 23.91 KG/M2 | WEIGHT: 135 LBS | DIASTOLIC BLOOD PRESSURE: 68 MMHG | HEART RATE: 69 BPM | TEMPERATURE: 98.2 F

## 2019-12-10 DIAGNOSIS — R68.89 FLU-LIKE SYMPTOMS: ICD-10-CM

## 2019-12-10 DIAGNOSIS — J40 BRONCHITIS: Primary | ICD-10-CM

## 2019-12-10 LAB
INFLUENZA VIRUS A RNA: NEGATIVE
INFLUENZA VIRUS B RNA: NEGATIVE

## 2019-12-10 PROCEDURE — 99213 OFFICE O/P EST LOW 20 MIN: CPT | Performed by: NURSE PRACTITIONER

## 2019-12-10 PROCEDURE — 1036F TOBACCO NON-USER: CPT | Performed by: NURSE PRACTITIONER

## 2019-12-10 PROCEDURE — 4040F PNEUMOC VAC/ADMIN/RCVD: CPT | Performed by: NURSE PRACTITIONER

## 2019-12-10 PROCEDURE — G8420 CALC BMI NORM PARAMETERS: HCPCS | Performed by: NURSE PRACTITIONER

## 2019-12-10 PROCEDURE — G8427 DOCREV CUR MEDS BY ELIG CLIN: HCPCS | Performed by: NURSE PRACTITIONER

## 2019-12-10 PROCEDURE — G8484 FLU IMMUNIZE NO ADMIN: HCPCS | Performed by: NURSE PRACTITIONER

## 2019-12-10 PROCEDURE — 87502 INFLUENZA DNA AMP PROBE: CPT | Performed by: NURSE PRACTITIONER

## 2019-12-10 PROCEDURE — G8399 PT W/DXA RESULTS DOCUMENT: HCPCS | Performed by: NURSE PRACTITIONER

## 2019-12-10 PROCEDURE — 1090F PRES/ABSN URINE INCON ASSESS: CPT | Performed by: NURSE PRACTITIONER

## 2019-12-10 PROCEDURE — 1123F ACP DISCUSS/DSCN MKR DOCD: CPT | Performed by: NURSE PRACTITIONER

## 2019-12-10 RX ORDER — BENZONATATE 100 MG/1
100 CAPSULE ORAL 3 TIMES DAILY PRN
Qty: 20 CAPSULE | Refills: 0 | Status: SHIPPED | OUTPATIENT
Start: 2019-12-10 | End: 2020-03-05

## 2019-12-10 RX ORDER — GUAIFENESIN 600 MG/1
600 TABLET, EXTENDED RELEASE ORAL 2 TIMES DAILY
Qty: 20 TABLET | Refills: 0 | Status: SHIPPED | OUTPATIENT
Start: 2019-12-10 | End: 2020-03-05

## 2019-12-10 RX ORDER — DOXYCYCLINE HYCLATE 100 MG
100 TABLET ORAL 2 TIMES DAILY
Qty: 20 TABLET | Refills: 0 | Status: SHIPPED | OUTPATIENT
Start: 2019-12-10 | End: 2019-12-20

## 2019-12-10 ASSESSMENT — ENCOUNTER SYMPTOMS
SINUS PRESSURE: 0
VOMITING: 0
WHEEZING: 0
SHORTNESS OF BREATH: 0
RHINORRHEA: 0
HEMOPTYSIS: 0
SORE THROAT: 0
CHEST TIGHTNESS: 1
COUGH: 1
HEARTBURN: 0
SINUS PAIN: 0
DIARRHEA: 0
NAUSEA: 0

## 2020-02-06 ENCOUNTER — OFFICE VISIT (OUTPATIENT)
Dept: FAMILY MEDICINE CLINIC | Age: 80
End: 2020-02-06
Payer: MEDICARE

## 2020-02-06 VITALS
SYSTOLIC BLOOD PRESSURE: 124 MMHG | WEIGHT: 136 LBS | BODY MASS INDEX: 23.22 KG/M2 | HEART RATE: 85 BPM | OXYGEN SATURATION: 95 % | DIASTOLIC BLOOD PRESSURE: 82 MMHG | HEIGHT: 64 IN

## 2020-02-06 LAB
A/G RATIO: 1.5 (ref 1.1–2.2)
ALBUMIN SERPL-MCNC: 3.8 G/DL (ref 3.4–5)
ALP BLD-CCNC: 58 U/L (ref 40–129)
ALT SERPL-CCNC: 14 U/L (ref 10–40)
ANION GAP SERPL CALCULATED.3IONS-SCNC: 11 MMOL/L (ref 3–16)
AST SERPL-CCNC: 18 U/L (ref 15–37)
BASOPHILS ABSOLUTE: 0.1 K/UL (ref 0–0.2)
BASOPHILS RELATIVE PERCENT: 0.9 %
BILIRUB SERPL-MCNC: 0.4 MG/DL (ref 0–1)
BUN BLDV-MCNC: 13 MG/DL (ref 7–20)
CALCIUM SERPL-MCNC: 9.2 MG/DL (ref 8.3–10.6)
CHLORIDE BLD-SCNC: 103 MMOL/L (ref 99–110)
CO2: 26 MMOL/L (ref 21–32)
CREAT SERPL-MCNC: 0.6 MG/DL (ref 0.6–1.2)
EOSINOPHILS ABSOLUTE: 0 K/UL (ref 0–0.6)
EOSINOPHILS RELATIVE PERCENT: 0.8 %
GFR AFRICAN AMERICAN: >60
GFR NON-AFRICAN AMERICAN: >60
GLOBULIN: 2.6 G/DL
GLUCOSE BLD-MCNC: 100 MG/DL (ref 70–99)
HCT VFR BLD CALC: 41.9 % (ref 36–48)
HEMOGLOBIN: 13.5 G/DL (ref 12–16)
LYMPHOCYTES ABSOLUTE: 1.2 K/UL (ref 1–5.1)
LYMPHOCYTES RELATIVE PERCENT: 21.7 %
MCH RBC QN AUTO: 26.6 PG (ref 26–34)
MCHC RBC AUTO-ENTMCNC: 32.3 G/DL (ref 31–36)
MCV RBC AUTO: 82.3 FL (ref 80–100)
MONOCYTES ABSOLUTE: 0.5 K/UL (ref 0–1.3)
MONOCYTES RELATIVE PERCENT: 8.8 %
NEUTROPHILS ABSOLUTE: 3.9 K/UL (ref 1.7–7.7)
NEUTROPHILS RELATIVE PERCENT: 67.8 %
PDW BLD-RTO: 14.1 % (ref 12.4–15.4)
PLATELET # BLD: 228 K/UL (ref 135–450)
PMV BLD AUTO: 7.9 FL (ref 5–10.5)
POTASSIUM SERPL-SCNC: 4.3 MMOL/L (ref 3.5–5.1)
RBC # BLD: 5.09 M/UL (ref 4–5.2)
SEDIMENTATION RATE, ERYTHROCYTE: 10 MM/HR (ref 0–30)
SODIUM BLD-SCNC: 140 MMOL/L (ref 136–145)
TOTAL PROTEIN: 6.4 G/DL (ref 6.4–8.2)
WBC # BLD: 5.8 K/UL (ref 4–11)

## 2020-02-06 PROCEDURE — 1090F PRES/ABSN URINE INCON ASSESS: CPT | Performed by: FAMILY MEDICINE

## 2020-02-06 PROCEDURE — 4040F PNEUMOC VAC/ADMIN/RCVD: CPT | Performed by: FAMILY MEDICINE

## 2020-02-06 PROCEDURE — G8427 DOCREV CUR MEDS BY ELIG CLIN: HCPCS | Performed by: FAMILY MEDICINE

## 2020-02-06 PROCEDURE — 36415 COLL VENOUS BLD VENIPUNCTURE: CPT | Performed by: FAMILY MEDICINE

## 2020-02-06 PROCEDURE — 1123F ACP DISCUSS/DSCN MKR DOCD: CPT | Performed by: FAMILY MEDICINE

## 2020-02-06 PROCEDURE — G8484 FLU IMMUNIZE NO ADMIN: HCPCS | Performed by: FAMILY MEDICINE

## 2020-02-06 PROCEDURE — G8420 CALC BMI NORM PARAMETERS: HCPCS | Performed by: FAMILY MEDICINE

## 2020-02-06 PROCEDURE — G0439 PPPS, SUBSEQ VISIT: HCPCS | Performed by: FAMILY MEDICINE

## 2020-02-06 PROCEDURE — 1036F TOBACCO NON-USER: CPT | Performed by: FAMILY MEDICINE

## 2020-02-06 PROCEDURE — G8399 PT W/DXA RESULTS DOCUMENT: HCPCS | Performed by: FAMILY MEDICINE

## 2020-02-06 PROCEDURE — 99214 OFFICE O/P EST MOD 30 MIN: CPT | Performed by: FAMILY MEDICINE

## 2020-02-06 ASSESSMENT — PATIENT HEALTH QUESTIONNAIRE - PHQ9
SUM OF ALL RESPONSES TO PHQ QUESTIONS 1-9: 0
SUM OF ALL RESPONSES TO PHQ QUESTIONS 1-9: 0

## 2020-02-06 ASSESSMENT — LIFESTYLE VARIABLES: HOW OFTEN DO YOU HAVE A DRINK CONTAINING ALCOHOL: 0

## 2020-02-06 NOTE — PROGRESS NOTES
Chief Complaint   Patient presents with    Medicare AW     AWV service provided today at patient request along with problem based services for new problems as below    Hand Pain     bilateral     Abdominal Pain     seeing jessica    Fatigue     extra fatigue and trouble getting over the bronchitis       [place] NARRATIVE:    Chest/abd pain and saw Dr. Georgette Carter and already has EGD scheduled for next week. She has a history of upper gi issues. Recent bronchitis is reported by patient and also has recurrent neck pain with this resp illness, with some lymph node enlargement and tenderness  She was seen by ENT for neck problems a few years ago. Lateral neck pain recurred with this recent illness, ekta on left. ENT though no finding and did no test.      She reports bilateral hand pain (actually at base of both thumbs) that limits her activities. No injury, insidious onset. No signs of synovitis. Patient is established unless otherwise noted. Above chief complaint and [place] obtained by this physician provider. Review of Systems   Constitutional: Negative for activity change, chills, fatigue and fever. HENT: Negative for congestion. Respiratory: Negative for cough, chest tightness, shortness of breath and wheezing. Cardiovascular: Negative for chest pain (atypical related to swallowing especially) and leg swelling. Gastrointestinal: Negative for abdominal pain. Musculoskeletal: Positive for arthralgias and neck pain (lateral neck pain and fullness with multiple lymph nodes). Negative for back pain and myalgias. Skin: Negative for rash. Psychiatric/Behavioral: Negative for behavioral problems and dysphoric mood. Allergies   Allergen Reactions    Prevnar 13 [Pneumococcal 13-Latisha Conj Vacc] Rash     Swelling of injection site and upper arm for more than 7 days. No infection.  Amoxicillin Rash    Flexeril [Cyclobenzaprine Hcl] Nausea And Vomiting     Allergy historyupdated.     Outpatient Medications Marked as Taking for the 2/6/20 encounter (Office Visit) with Denise Banks MD   Medication Sig Dispense Refill    diclofenac sodium 1 % GEL Apply 2 g topically 4 times daily 2 Tube 1       Tobacco use history updated. Social History     Tobacco Use   Smoking Status Never Smoker   Smokeless Tobacco Never Used        Nursing note reviewed. Vitals:    02/06/20 1439   BP: 124/82   Site: Left Upper Arm   Position: Sitting   Cuff Size: Medium Adult   Pulse: 85   SpO2: 95%   Weight: 136 lb (61.7 kg)   Height: 5' 4\" (1.626 m)          BP Readings from Last 3 Encounters:   02/06/20 124/82   12/10/19 122/68   11/19/19 116/80     Wt Readings from Last 3 Encounters:   02/06/20 136 lb (61.7 kg)   12/10/19 135 lb (61.2 kg)   11/19/19 134 lb 9.6 oz (61.1 kg)     Body mass index is 23.34 kg/m². No results found for this visit on 02/06/20. Physical Exam  Vitals signs and nursing note reviewed. Constitutional:       General: She is not in acute distress. Appearance: She is well-developed. She is not diaphoretic. HENT:      Head: Normocephalic. Eyes:      General:         Right eye: No discharge. Left eye: No discharge. Conjunctiva/sclera: Conjunctivae normal.      Pupils: Pupils are equal, round, and reactive to light. Neck:      Musculoskeletal: Normal range of motion. Cardiovascular:      Rate and Rhythm: Normal rate and regular rhythm. Heart sounds: Normal heart sounds. No murmur. Pulmonary:      Effort: Pulmonary effort is normal. No respiratory distress. Breath sounds: Normal breath sounds. No wheezing or rales. Musculoskeletal:         General: Deformity (mild enlargemtn of bilateral 1st metacarpocarpal joint, some pain also at 1st mcp) present. Lymphadenopathy:      Cervical: Cervical adenopathy (bilateral multiple nodes, some about 1 cm; none in axilla or elsewhere) present. Skin:     General: Skin is warm and dry.    Neurological:      Mental Status: She is alert and oriented to person, place, and time. Psychiatric:         Behavior: Behavior normal.             _________________________________________________  Assessment:     Rena Hammond was seen today for medicare awv, hand pain, abdominal pain and fatigue. Diagnoses and all orders for this visit:    Routine general medical examination at a health care facility  -     Comprehensive Metabolic Panel    Degenerative arthritis of thumb, right  -     diclofenac sodium 1 % GEL; Apply 2 g topically 4 times daily  -     Sedimentation Rate    Cervical lymphadenopathy  -     CBC WITH AUTO DIFFERENTIAL    Atypical chest pain      Problems listed above are stable except as follows: recurrent chest pain and GI issues, seeing specialist.  Hands with probable OA thumbs, trial voltaren gel for now, may need referral.  Check sed rate. Moderate lymphadenopathy in neck is of concern, will check cbc with diff. I think she might need to go back to ENT for multiple palpable viviana. Therapeutic plan is unchanged unless otherwise specified. See orders above and comments below for details of workup or medication orders. _________________________________________________  Plan:     Karl Villegas or Leana Mediate if gel no help to hands. She will bring living will and hcpoa. Return in about 3 weeks (around 2/27/2020), or if symptoms worsen or fail to improve, for Medicare Annual Wellness Visit in 1 year, recheck on neck and hands after tests.       Electronically signed by Dustin Bradshaw MD on 2/6/20 at 2:55 PM

## 2020-02-06 NOTE — PROGRESS NOTES
Cervical cancer (HCC)     Dysuria     Edema     Female bladder prolapse     Hernia, hiatal     Insomnia     Lipoma of neck     Lumbago     Nodule of kidney     Osteoarthritis     Plantar fasciitis     PVD [facility]peripheral vascular disease) ([facility])     Sinusitis     Varicose veins of both lower extremities 2014    Dr Dakota Galeano       Past Surgical History:   Procedure Laterality Date    BLADDER REPAIR  2005    HERNIA REPAIR      HYSTERECTOMY, TOTAL ABDOMINAL  10/10/2015    cervix ca-adenoid basal epithelioma    KNEE ARTHROSCOPY Left 04/07/2014    Dr Dylon Hunter       Family History   Problem Relation Age of Onset    Diabetes Mother     Colon Cancer Mother     Heart Failure Father     Cancer Sister         tonsil, galbladder    Diabetes Sister     Heart Failure Sister     Hypertension Sister    Deric Shan Migraines Sister     Diabetes Brother     Heart Failure Brother     Cancer Other         several aunts and uncles    Breast Cancer Brother        CareTeam (Including outside providers/suppliers regularly involved in providing care):   Patient Care Team:  Cristofer Ledbetter MD as PCP - General (Family Medicine)  Cristofer Ledbetter MD as PCP - [facility] Empaneled Provider  Dustin Day MD as Consulting Physician (Gastroenterology)  Ricarda Crenshaw MD as Consulting Physician (Cardiology)    Wt Readings from Last 3 Encounters:   02/06/20 136 lb (61.7 kg)   12/10/19 135 lb (61.2 kg)   11/19/19 134 lb 9.6 oz (61.1 kg)     Vitals:    02/06/20 1439   BP: 124/82   Site: Left Upper Arm   Position: Sitting   Cuff Size: Medium Adult   Pulse: 85   SpO2: 95%   Weight: 136 lb (61.7 kg)   Height: 5' 4\" (1.626 m)     Body mass index is 23.34 kg/m². Based upon direct observation of the patient, evaluation of cognition reveals recent and remote memory intact. Patient's complete Health Risk Assessment and screening values have been reviewed and are found in Flowsheets.  The following problems were reviewed today and where indicated follow up appointments were made and/or referrals ordered. Positive Risk Factor Screenings with Interventions:     Cognitive:  Clock Drawing Test (CDT) Score: Normal  Total Score Interpretation: Positive Mini-Cog  Cognitive Impairment Interventions:  · none indicated    General Health:  General  In general, how would you say your health is?: Good  In the past 7 days, have you experienced any of the following? New or Increased Pain, New or Increased Fatigue, Loneliness, Social Isolation, Stress or Anger?: (!) New or Increased Fatigue  Do you get the social and emotional support that you need?: Yes  Do you have a Living Will?: Yes  General Health Risk Interventions:  · Fatigue: patient declines any further evaluation/treatment for this issue except as addressed in notes. Personalized Preventive Plan   Current Health Maintenance Status  Immunization History   Administered Date(s) Administered    Pneumococcal Conjugate 13-valent (Sonny Rach) 07/19/2017    Td, unspecified formulation 12/12/2011        Health Maintenance   Topic Date Due    DTaP/Tdap/Td vaccine (1 - Tdap) 03/24/1951    Shingles Vaccine (1 of 2) 03/24/1988    Flu vaccine (1) 09/01/2019    Annual Wellness Visit (AWV)  01/29/2020    DEXA (modify frequency per FRAX score)  Completed    Hepatitis A vaccine  Aged Out    Hepatitis B vaccine  Aged Out    Hib vaccine  Aged Out    Meningococcal (ACWY) vaccine  Aged Out     Recommendations for InstallFree Due: see orders and patient instructions/AVS.  . Recommended screening schedule for the next 5-10 years is provided to the patient in written form: see Patient Instructions/AVS.    There are no diagnoses linked to this encounter.

## 2020-02-06 NOTE — PATIENT INSTRUCTIONS
legal in the state where you now live. Or you might use a universal form that has been approved by many states. This kind of form can sometimes be completed and stored online. Your electronic copy will then be available wherever you have a connection to the Internet. In most cases, doctors will respect your wishes even if you have a form from a different state. · You don't need an  to complete a living will. But legal advice can be helpful if your state's laws are unclear, your health history is complicated, or your family can't agree on what should be in your living will. · You can change your living will at any time. Some people find that their wishes about end-of-life care change as their health changes. · In addition to making a living will, think about completing a medical power of  form. This form lets you name the person you want to make end-of-life treatment decisions for you (your \"health care agent\") if you're not able to. Many hospitals and nursing homes will give you the forms you need to complete a living will and a medical power of . · Your living will is used only if you can't make or communicate decisions for yourself anymore. If you become able to make decisions again, you can accept or refuse any treatment, no matter what you wrote in your living will. · Your state may offer an online registry. This is a place where you can store your living will online so the doctors and nurses who need to treat you can find it right away. What should you think about when creating a living will? Talk about your end-of-life wishes with your family members and your doctor. Let them know what you want. That way the people making decisions for you won't be surprised by your choices. Think about these questions as you make your living will:  · Do you know enough about life support methods that might be used?  If not, talk to your doctor so you know what might be done if you can't breathe

## 2020-02-16 ASSESSMENT — ENCOUNTER SYMPTOMS
BACK PAIN: 0
CHEST TIGHTNESS: 0
ABDOMINAL PAIN: 0
WHEEZING: 0
SHORTNESS OF BREATH: 0
COUGH: 0

## 2020-02-18 ENCOUNTER — HOSPITAL ENCOUNTER (OUTPATIENT)
Dept: ULTRASOUND IMAGING | Age: 80
Discharge: HOME | End: 2020-02-18
Payer: MEDICARE

## 2020-02-18 PROCEDURE — 76705 ECHO EXAM OF ABDOMEN: CPT

## 2020-03-05 ENCOUNTER — OFFICE VISIT (OUTPATIENT)
Dept: FAMILY MEDICINE CLINIC | Age: 80
End: 2020-03-05
Payer: MEDICARE

## 2020-03-05 VITALS — OXYGEN SATURATION: 98 % | HEART RATE: 74 BPM | SYSTOLIC BLOOD PRESSURE: 128 MMHG | DIASTOLIC BLOOD PRESSURE: 84 MMHG

## 2020-03-05 PROCEDURE — 1090F PRES/ABSN URINE INCON ASSESS: CPT | Performed by: FAMILY MEDICINE

## 2020-03-05 PROCEDURE — G8484 FLU IMMUNIZE NO ADMIN: HCPCS | Performed by: FAMILY MEDICINE

## 2020-03-05 PROCEDURE — 99214 OFFICE O/P EST MOD 30 MIN: CPT | Performed by: FAMILY MEDICINE

## 2020-03-05 PROCEDURE — 4040F PNEUMOC VAC/ADMIN/RCVD: CPT | Performed by: FAMILY MEDICINE

## 2020-03-05 PROCEDURE — G8420 CALC BMI NORM PARAMETERS: HCPCS | Performed by: FAMILY MEDICINE

## 2020-03-05 PROCEDURE — 1036F TOBACCO NON-USER: CPT | Performed by: FAMILY MEDICINE

## 2020-03-05 PROCEDURE — G8427 DOCREV CUR MEDS BY ELIG CLIN: HCPCS | Performed by: FAMILY MEDICINE

## 2020-03-05 PROCEDURE — 1123F ACP DISCUSS/DSCN MKR DOCD: CPT | Performed by: FAMILY MEDICINE

## 2020-03-05 PROCEDURE — G8399 PT W/DXA RESULTS DOCUMENT: HCPCS | Performed by: FAMILY MEDICINE

## 2020-03-05 RX ORDER — ZOLPIDEM TARTRATE 10 MG/1
10 TABLET ORAL NIGHTLY PRN
Qty: 10 TABLET | Refills: 1 | Status: SHIPPED | OUTPATIENT
Start: 2020-03-05 | End: 2020-05-19

## 2020-03-05 NOTE — PROGRESS NOTES
Chief Complaint   Patient presents with    Other     3 week follow up on neck and hand pain     Hernia     pt c/o pain when she eats, pt scheduled on 3/11/20 for small bowel follow through        [place]:    She continues to have concerns and issues with hand pain, prob osteoarthritis or tendinitis. Hands are better with voltaren gel, labs done and essentially normal.  She continues to have enlarged cervical lymph node, about 4 cm henrietta in left neck, it was evaluated by ENT several years ago, our local group she is not sure which doctor. It has persisted and now she has an indurated mass several centimeters but nontender. It is not mobile. I find it worrisome and since cannot find documentation of prior evaluation we will send her back for a recheck. On meds for h pylori from Dr. Court Smith, but she could not take it due to nausea, so quit it but did not call him. She was hoping to use apple cider vinegar but I don't think that will be adequate. I advised her to call back and get alternative medication. 3/11 gets small bowel follow through. Known hiatal hernia, asking me today about surgical options for this it is reportedly getting larger. She has persistent sx of this despite medication. Patient is established unless otherwise noted. Above chief complaint and [place] obtained by this physician provider. Review of Systems   Constitutional: Negative for activity change, chills, fatigue and fever. HENT: Negative for congestion. Respiratory: Negative for cough, chest tightness, shortness of breath and wheezing. Cardiovascular: Negative for chest pain and leg swelling. Gastrointestinal: Positive for abdominal pain (heartburn). Musculoskeletal: Positive for arthralgias. Negative for back pain and myalgias. Skin: Negative for rash. Psychiatric/Behavioral: Negative for behavioral problems and dysphoric mood.        Allergies   Allergen Reactions    Prevnar 13 [Pneumococcal 13-Latisha Conj equal, round, and reactive to light. Neck:      Musculoskeletal: Normal range of motion. Cardiovascular:      Rate and Rhythm: Normal rate and regular rhythm. Heart sounds: Normal heart sounds. No murmur. Pulmonary:      Effort: Pulmonary effort is normal. No respiratory distress. Breath sounds: Normal breath sounds. No wheezing or rales. Lymphadenopathy:      Cervical: Cervical adenopathy (left posterior cervical [place], 4 cm henrietta, multiple additional scattered lesions) present. Skin:     General: Skin is warm and dry. Neurological:      Mental Status: She is alert and oriented to person, place, and time. Psychiatric:         Behavior: Behavior normal.         NARX is ok.      _________________________________________________  Assessment:     [facility] was seen today for other and hernia. Diagnoses and all orders for this visit:    Hiatal hernia  -     Cristiane Arreguin MD, General Surgery, [address] [place] [address] for breast cancer  -     [facility] Screening Bilateral    Epigastric pain    Cervical lymphadenopathy  -     [facility]    H. pylori infection    Insomnia, unspecified type  -     zolpidem (AMBIEN) 10 MG tablet; Take 1 tablet by mouth nightly as needed for Sleep for up to 10 days. Problems listed above are stable except as follows: we will ask her to see Dr. Stanislaw Benz for her hiatal hernia, to see what he would do for her and if he would advise surgery. ALSO WE WILL ASK HIM to look at her neck mass. I am worried by its indurated quality, she is not really wanting to go back to old ENT provider. Therapeutic plan is unchanged unless otherwise specified. See orders above and comments below for details of workup or medication orders. _________________________________________________  Plan:     Patient will call Dr. Mirta Covington and get new antbiotics. Dr. Stanislaw Benz for hiatal hernia and neck mass.        Return in about 4 months

## 2020-03-06 ASSESSMENT — ENCOUNTER SYMPTOMS
COUGH: 0
WHEEZING: 0
ABDOMINAL PAIN: 1
SHORTNESS OF BREATH: 0
CHEST TIGHTNESS: 0
BACK PAIN: 0

## 2020-03-11 ENCOUNTER — HOSPITAL ENCOUNTER (OUTPATIENT)
Dept: GENERAL RADIOLOGY | Age: 80
Discharge: HOME | End: 2020-03-11
Payer: MEDICARE

## 2020-03-11 PROCEDURE — 74250 X-RAY XM SM INT 1CNTRST STD: CPT

## 2020-05-18 ENCOUNTER — TELEPHONE (OUTPATIENT)
Dept: FAMILY MEDICINE CLINIC | Age: 80
End: 2020-05-18

## 2020-05-18 RX ORDER — SULFAMETHOXAZOLE AND TRIMETHOPRIM 800; 160 MG/1; MG/1
1 TABLET ORAL 2 TIMES DAILY
Qty: 14 TABLET | Refills: 0 | Status: SHIPPED | OUTPATIENT
Start: 2020-05-18 | End: 2022-08-29 | Stop reason: SDUPTHER

## 2020-05-18 NOTE — TELEPHONE ENCOUNTER
Pt called and stated that she has been having vaginal discharge for about 2 weeks and last night she was up 4-5 times with a lot of bladder pain and freq. urination which is also painful. Pt states that she is having a lot of pain because she has a prolapsed bladder. Sp to pcp and she stated that she would call something in for pt. Pt aware   Pharmacy: [address]

## 2020-05-19 RX ORDER — ZOLPIDEM TARTRATE 10 MG/1
10 TABLET ORAL NIGHTLY PRN
Qty: 10 TABLET | Refills: 0 | Status: SHIPPED | OUTPATIENT
Start: 2020-05-19 | End: 2020-06-18 | Stop reason: SDUPTHER

## 2020-06-17 ENCOUNTER — TELEPHONE (OUTPATIENT)
Dept: FAMILY MEDICINE CLINIC | Age: 80
End: 2020-06-17

## 2020-06-18 RX ORDER — ZOLPIDEM TARTRATE 10 MG/1
10 TABLET ORAL NIGHTLY PRN
Qty: 10 TABLET | Refills: 0 | Status: SHIPPED | OUTPATIENT
Start: 2020-06-18 | End: 2020-07-16 | Stop reason: SDUPTHER

## 2020-07-16 RX ORDER — ZOLPIDEM TARTRATE 10 MG/1
10 TABLET ORAL NIGHTLY PRN
Qty: 10 TABLET | Refills: 0 | Status: SHIPPED | OUTPATIENT
Start: 2020-07-16 | End: 2020-08-14 | Stop reason: SDUPTHER

## 2020-08-14 RX ORDER — ZOLPIDEM TARTRATE 10 MG/1
10 TABLET ORAL NIGHTLY PRN
Qty: 10 TABLET | Refills: 0 | Status: SHIPPED | OUTPATIENT
Start: 2020-08-14 | End: 2020-08-31 | Stop reason: SDUPTHER

## 2020-08-31 ENCOUNTER — OFFICE VISIT (OUTPATIENT)
Dept: FAMILY MEDICINE CLINIC | Age: 80
End: 2020-08-31
Payer: MEDICARE

## 2020-08-31 VITALS
OXYGEN SATURATION: 96 % | HEART RATE: 69 BPM | DIASTOLIC BLOOD PRESSURE: 90 MMHG | BODY MASS INDEX: 22.83 KG/M2 | SYSTOLIC BLOOD PRESSURE: 156 MMHG | WEIGHT: 133 LBS

## 2020-08-31 PROCEDURE — 1090F PRES/ABSN URINE INCON ASSESS: CPT | Performed by: FAMILY MEDICINE

## 2020-08-31 PROCEDURE — 99214 OFFICE O/P EST MOD 30 MIN: CPT | Performed by: FAMILY MEDICINE

## 2020-08-31 PROCEDURE — G8427 DOCREV CUR MEDS BY ELIG CLIN: HCPCS | Performed by: FAMILY MEDICINE

## 2020-08-31 PROCEDURE — 4040F PNEUMOC VAC/ADMIN/RCVD: CPT | Performed by: FAMILY MEDICINE

## 2020-08-31 PROCEDURE — 1123F ACP DISCUSS/DSCN MKR DOCD: CPT | Performed by: FAMILY MEDICINE

## 2020-08-31 PROCEDURE — G8420 CALC BMI NORM PARAMETERS: HCPCS | Performed by: FAMILY MEDICINE

## 2020-08-31 PROCEDURE — G8399 PT W/DXA RESULTS DOCUMENT: HCPCS | Performed by: FAMILY MEDICINE

## 2020-08-31 PROCEDURE — 1036F TOBACCO NON-USER: CPT | Performed by: FAMILY MEDICINE

## 2020-08-31 RX ORDER — ZOLPIDEM TARTRATE 10 MG/1
10 TABLET ORAL NIGHTLY PRN
Qty: 10 TABLET | Refills: 5 | Status: SHIPPED | OUTPATIENT
Start: 2020-08-31 | End: 2021-03-08

## 2020-08-31 ASSESSMENT — ENCOUNTER SYMPTOMS
BACK PAIN: 0
SHORTNESS OF BREATH: 0
CHEST TIGHTNESS: 0
ABDOMINAL PAIN: 0
WHEEZING: 0
COUGH: 0

## 2020-08-31 NOTE — PROGRESS NOTES
Chief Complaint   Patient presents with    Jaw Pain     4 month follow up pt c/o jaw popping post surgery     Insomnia     has tried multiple alternative medications including melatonin, and others, and remotely tried tylenol and advil PM or insomnia, they all gave her hangover, so she uses half an ambien 10 mg most nights, I agreed to continue this med for now   [employer]     2x2 cm firm mass in left neck, not very mobile, a few indurated nodes in right neck near SCM as well       [place] NARRATIVE:    She had hiatal hernia surgery in July, with Dr. Roseann Klinefelter, although I had referred her to Dr. Thelma Ramos. She has done much better with gi/stomach issues since surgery and no pain or reflux. Jaw popping without pain, it is bothersome to her. It did not start until after surgery and she never had a problem before, although she also never had any dental work done she says. She also has an old indurated neck mass worked up previously. I was a little bit worried about it and wanted the surgeon to look at it also as she did not want to return to previous ENT. She did not ask Dr. Roseann Klinefelter about it but asks to go back to see him about the mass in stead of anyone else. They are not painful. She is actually fairly insistent that I continue her [facility]. Patient is established unless otherwise noted. Above chief complaint and [place] obtained by this physician provider. Review of Systems   Constitutional: Negative for activity change, chills, fatigue and fever. HENT: Positive for ear pain (jaw pain near right ear). Negative for congestion. Respiratory: Negative for cough, chest tightness, shortness of breath and wheezing. Cardiovascular: Negative for chest pain and leg swelling. Gastrointestinal: Negative for abdominal pain. Musculoskeletal: Negative for back pain and myalgias. Skin: Negative for rash. Psychiatric/Behavioral: Negative for behavioral problems and dysphoric mood.        Allergies Allergen Reactions    Prevnar 13 [Pneumococcal 13-Latisha Conj Vacc] Rash     Swelling of injection site and upper arm for more than 7 days. No infection.  Amoxicillin Rash    Flexeril [Cyclobenzaprine Hcl] Nausea And Vomiting     Allergy historyupdated. Outpatient Medications Marked as Taking for the 8/31/20 encounter (Office Visit) with Little Rainey MD   Medication Sig Dispense Refill    zolpidem (AMBIEN) 10 MG tablet Take 1 tablet by mouth nightly as needed for Sleep for up to 30 days. (mas allowance 10/30 days) 10 tablet 5    diclofenac sodium 1 % GEL Apply 2 g topically 4 times daily 2 Tube 1    ELDERBERRY PO Take by mouth      latanoprost (XALATAN) 0.005 % ophthalmic solution       vitamin B-12 (CYANOCOBALAMIN) 100 MCG tablet Take 50 mcg by mouth daily      Ascorbic Acid (VITAMIN C) 500 MG tablet Take 500 mg by mouth daily      CINNAMON PO Take by mouth daily      Multiple Vitamin (MULTI VITAMIN DAILY PO) Take by mouth daily      Calcium Citrate-Vitamin D (CALCIUM + D PO) Take by mouth daily         Tobacco use history updated. Social History     Tobacco Use   Smoking Status Never Smoker   Smokeless Tobacco Never Used        Nursing note reviewed. Vitals:    08/31/20 1131   BP: (!) 156/90   Site: Left Upper Arm   Position: Sitting   Cuff Size: Medium Adult   Pulse: 69   SpO2: 96%   Weight: 133 lb (60.3 kg)          BP Readings from Last 3 Encounters:   08/31/20 (!) 156/90   03/05/20 128/84   02/06/20 124/82     Wt Readings from Last 3 Encounters:   08/31/20 133 lb (60.3 kg)   08/24/20 133 lb (60.3 kg)   08/10/20 137 lb (62.1 kg)     Body mass index is 22.83 kg/m². No results found for this visit on 08/31/20. Physical Exam  Vitals signs and nursing note reviewed. Constitutional:       General: She is not in acute distress. Appearance: She is well-developed. She is not diaphoretic. HENT:      Head: Normocephalic. Eyes:      General:         Right eye: No discharge.

## 2021-01-25 ENCOUNTER — TELEPHONE (OUTPATIENT)
Dept: FAMILY MEDICINE CLINIC | Age: 81
End: 2021-01-25

## 2021-01-25 NOTE — TELEPHONE ENCOUNTER
Pt called stating that she went to dentist and they gave clindamycin hcl 600 mg, she was to take it 2 hrs before she went in. Pt stated that she has 3 leaky valves so needs antibiotics before she goes. now pt thinks that she has thrush. Her tongue burns and is red. Pt states she is 78 and does not want to drive in the bad weather.  Pt wanted appt Friday but I recomened I send a message to provider so she does not have to wait all week, please advise

## 2021-01-26 RX ORDER — CLOTRIMAZOLE 10 MG/1
10 LOZENGE ORAL; TOPICAL
Qty: 50 TABLET | Refills: 0 | Status: SHIPPED | OUTPATIENT
Start: 2021-01-26 | End: 2021-02-05

## 2021-01-26 NOTE — TELEPHONE ENCOUNTER
Let patient know that prescription was sent for clotrimazole dissolving tablets, to be held in mouth until resolved. She has not eligible for Diflucan as it has risk with structural heart disease. Make sure she does not drink any acidic beverages or eat acidic foods like citrus or too much tomato. If tongue burning does not resolve she should call us back.

## 2021-02-03 NOTE — TELEPHONE ENCOUNTER
Called to check on pt, she states she was able to get the rx and is feeling much better, pt advised to call back if sx do not resolve

## 2021-03-06 DIAGNOSIS — G47.00 INSOMNIA, UNSPECIFIED TYPE: ICD-10-CM

## 2021-03-08 DIAGNOSIS — G47.00 INSOMNIA, UNSPECIFIED TYPE: ICD-10-CM

## 2021-03-08 RX ORDER — ZOLPIDEM TARTRATE 10 MG/1
5 TABLET ORAL NIGHTLY PRN
Qty: 10 TABLET | Refills: 0 | Status: SHIPPED | OUTPATIENT
Start: 2021-03-08 | End: 2021-04-05 | Stop reason: SDUPTHER

## 2021-03-08 RX ORDER — ZOLPIDEM TARTRATE 10 MG/1
5 TABLET ORAL NIGHTLY PRN
Qty: 10 TABLET | Refills: 0 | OUTPATIENT
Start: 2021-03-08 | End: 2021-04-07

## 2021-03-08 NOTE — TELEPHONE ENCOUNTER
MARIBELLX is reviewed. I have only allowed #10/month. She has a f-u scheduled in early April. One month supply sent.

## 2021-04-05 ENCOUNTER — OFFICE VISIT (OUTPATIENT)
Dept: FAMILY MEDICINE CLINIC | Age: 81
End: 2021-04-05
Payer: MEDICARE

## 2021-04-05 VITALS
WEIGHT: 140 LBS | HEART RATE: 75 BPM | OXYGEN SATURATION: 99 % | DIASTOLIC BLOOD PRESSURE: 74 MMHG | SYSTOLIC BLOOD PRESSURE: 112 MMHG | HEIGHT: 64 IN | BODY MASS INDEX: 23.9 KG/M2

## 2021-04-05 DIAGNOSIS — L30.9 FINGERTIP ECZEMA: ICD-10-CM

## 2021-04-05 DIAGNOSIS — Z00.00 ROUTINE GENERAL MEDICAL EXAMINATION AT A HEALTH CARE FACILITY: Primary | ICD-10-CM

## 2021-04-05 DIAGNOSIS — G47.00 INSOMNIA, UNSPECIFIED TYPE: ICD-10-CM

## 2021-04-05 PROCEDURE — G0439 PPPS, SUBSEQ VISIT: HCPCS | Performed by: FAMILY MEDICINE

## 2021-04-05 PROCEDURE — 1090F PRES/ABSN URINE INCON ASSESS: CPT | Performed by: FAMILY MEDICINE

## 2021-04-05 PROCEDURE — G8399 PT W/DXA RESULTS DOCUMENT: HCPCS | Performed by: FAMILY MEDICINE

## 2021-04-05 PROCEDURE — 1123F ACP DISCUSS/DSCN MKR DOCD: CPT | Performed by: FAMILY MEDICINE

## 2021-04-05 PROCEDURE — G8427 DOCREV CUR MEDS BY ELIG CLIN: HCPCS | Performed by: FAMILY MEDICINE

## 2021-04-05 PROCEDURE — 1036F TOBACCO NON-USER: CPT | Performed by: FAMILY MEDICINE

## 2021-04-05 PROCEDURE — G8420 CALC BMI NORM PARAMETERS: HCPCS | Performed by: FAMILY MEDICINE

## 2021-04-05 PROCEDURE — 99213 OFFICE O/P EST LOW 20 MIN: CPT | Performed by: FAMILY MEDICINE

## 2021-04-05 PROCEDURE — 4040F PNEUMOC VAC/ADMIN/RCVD: CPT | Performed by: FAMILY MEDICINE

## 2021-04-05 RX ORDER — ZOLPIDEM TARTRATE 10 MG/1
5 TABLET ORAL NIGHTLY PRN
Qty: 10 TABLET | Refills: 0 | Status: SHIPPED | OUTPATIENT
Start: 2021-04-05 | End: 2021-05-04

## 2021-04-05 RX ORDER — ZOSTER VACCINE RECOMBINANT, ADJUVANTED 50 MCG/0.5
0.5 KIT INTRAMUSCULAR SEE ADMIN INSTRUCTIONS
Qty: 0.5 ML | Refills: 0 | Status: CANCELLED | OUTPATIENT
Start: 2021-04-05 | End: 2021-10-02

## 2021-04-05 RX ORDER — BETAMETHASONE DIPROPIONATE 0.05 %
OINTMENT (GRAM) TOPICAL
Qty: 45 G | Refills: 1 | Status: SHIPPED | OUTPATIENT
Start: 2021-04-05 | End: 2021-08-25

## 2021-04-05 ASSESSMENT — ENCOUNTER SYMPTOMS
WHEEZING: 0
SHORTNESS OF BREATH: 0
BACK PAIN: 0
COUGH: 0
ABDOMINAL PAIN: 0
CHEST TIGHTNESS: 0

## 2021-04-05 ASSESSMENT — PATIENT HEALTH QUESTIONNAIRE - PHQ9
SUM OF ALL RESPONSES TO PHQ9 QUESTIONS 1 & 2: 0
1. LITTLE INTEREST OR PLEASURE IN DOING THINGS: 0
2. FEELING DOWN, DEPRESSED OR HOPELESS: 0

## 2021-04-05 NOTE — PATIENT INSTRUCTIONS
Patient Education        Walking for Exercise: Care Instructions  Your Care Instructions     Walking is one of the easiest ways to get the exercise you need for good health. A brisk, 30-minute walk each day can help you feel better and have more energy. It can help you lower your risk of disease. Walking can help you keep your bones strong and your heart healthy. Check with your doctor before you start a walking plan if you have heart problems, other health issues, or you have not been active in a long time. Follow your doctor's instructions for safe levels of exercise. Follow-up care is a key part of your treatment and safety. Be sure to make and go to all appointments, and call your doctor if you are having problems. It's also a good idea to know your test results and keep a list of the medicines you take. How can you care for yourself at home? Getting started  · Start slowly and set a short-term goal. For example, walk for 5 or 10 minutes every day. · Bit by bit, increase the amount you walk every day. Try for at least 30 minutes on most days of the week. You also may want to swim, bike, or do other activities. · If finding enough time is a problem, it's fine to be active in shorter periods of time throughout your day. · To get the heart-healthy benefits of walking, you need to walk briskly enough to increase your heart rate and breathing, but not so fast that you can't talk comfortably. · Wear comfortable shoes that fit well and provide good support for your feet and ankles. Staying with your plan  · After you've made walking a habit, set a longer-term goal. You may want to set a goal of walking briskly for longer or walking farther. Experts say to do 2½ hours (150 minutes) of moderate activity a week. A faster heartbeat is what defines moderate-level activity. · To stay motivated, walk with friends, coworkers, or pets. · Use a phone graeme or pedometer to track your steps each day.  Set a goal to increase your steps. When you reach that goal, set a higher goal.  · If the weather keeps you from walking outside, go for walks at the mall with a friend. Local schools and churches may have indoor gyms where you can walk. Fitting a walk into your workday  · Park several blocks away from work, or get off the bus a few stops early. · Use the stairs instead of the elevator, at least for a few floors. · Suggest holding meetings with colleagues during a walk inside or outside the building. · Use the restroom that is the farthest from your desk or workstation. · Use your morning and afternoon breaks to take quick 15 minutes walks. Staying safe  · Know your surroundings. Walk in a well-lighted, safe place. If it's dark, walk with a partner. Wear light-colored clothing. If you can, buy a vest or jacket that reflects light. · Carry a cell phone for emergencies. · Drink plenty of water. Take a water bottle with you when you walk. This is very important if it is hot out. · Be careful not to slip on wet or icy ground. You can buy \"grippers\" for your shoes to help keep you from slipping. · Pay attention to your walking surface. Use sidewalks and paths. · If you have health issues such as asthma, COPD, or heart problems, or if you haven't been active for a long time, check with your doctor before you start a new activity. Where can you learn more? Go to https://TexerematHighRoads.healthPhotofy. org and sign in to your GazeHawk account. Enter R159 in the [facility] box to learn more about \"Walking for Exercise: Care Instructions. \"     If you do not have an account, please click on the \"Sign Up Now\" link. Current as of: September 10, 2020               Content Version: 12.8  © 0056-0908 Healthwise, Incorporated. Care instructions adapted under license by [facility] ([facility]).  If you have questions about a medical condition or this instruction, always ask your healthcare professional. Cecilio Espinal disclaims any warranty or liability for your use of this information. Patient Education        Preventing Falls: Care Instructions  Your Care Instructions     Getting around your home safely can be a challenge if you have injuries or health problems that make it easy for you to fall. Loose rugs and furniture in walkways are among the dangers for many older people who have problems walking or who have poor eyesight. People who have conditions such as arthritis, osteoporosis, or dementia also have to be careful not to fall. You can make your home safer with a few simple measures. Follow-up care is a key part of your treatment and safety. Be sure to make and go to all appointments, and call your doctor if you are having problems. It's also a good idea to know your test results and keep a list of the medicines you take. How can you care for yourself at home? Taking care of yourself  · You may get dizzy if you do not drink enough water. To prevent dehydration, drink plenty of fluids. Choose water and other caffeine-free clear liquids. If you have kidney, heart, or liver disease and have to limit fluids, talk with your doctor before you increase the amount of fluids you drink. · Exercise regularly to improve your strength, muscle tone, and balance. Walk if you can. Swimming may be a good choice if you cannot walk easily. · Have your vision and hearing checked each year or any time you notice a change. If you have trouble seeing and hearing, you might not be able to avoid objects and could lose your balance. · Know the side effects of the medicines you take. Ask your doctor or pharmacist whether the medicines you take can affect your balance. Sleeping pills or sedatives can affect your balance. · Limit the amount of alcohol you drink. Alcohol can impair your balance and other senses. · Ask your doctor whether calluses or corns on your feet need to be removed.  If you wear loose-fitting shoes because of calluses or corns, you can lose your balance and fall. · Talk to your doctor if you have numbness in your feet. Preventing falls at home  · Remove raised doorway thresholds, throw rugs, and clutter. Repair loose carpet or raised areas in the floor. · Move furniture and electrical cords to keep them out of walking paths. · Use nonskid floor wax, and wipe up spills right away, especially on ceramic tile floors. · If you use a walker or cane, put rubber tips on it. If you use crutches, clean the bottoms of them regularly with an abrasive pad, such as steel wool. · Keep your house well lit, especially Anamika Stephens, and outside walkways. Use night-lights in areas such as hallways and bathrooms. Add extra light switches or use remote switches (such as switches that go on or off when you clap your hands) to make it easier to turn lights on if you have to get up during the night. · Install sturdy handrails on stairways. · Move items in your cabinets so that the things you use a lot are on the lower shelves (about waist level). · Keep a cordless phone and a flashlight with new batteries by your bed. If possible, put a phone in each of the main rooms of your house, or carry a cell phone in case you fall and cannot reach a phone. Or, you can wear a device around your neck or wrist. You push a button that sends a signal for help. · Wear low-heeled shoes that fit well and give your feet good support. Use footwear with nonskid soles. Check the heels and soles of your shoes for wear. Repair or replace worn heels or soles. · Do not wear socks without shoes on wood floors. · Walk on the grass when the sidewalks are slippery. If you live in an area that gets snow and ice in the winter, sprinkle salt on slippery steps and sidewalks. Preventing falls in the bath  · Install grab bars and nonskid mats inside and outside your shower or tub and near the toilet and sinks. · Use shower chairs and bath benches.   · Use a hand-held shower head that will allow you to sit while showering. · Get into a tub or shower by putting the weaker leg in first. Get out of a tub or shower with your strong side first.  · Repair loose toilet seats and consider installing a raised toilet seat to make getting on and off the toilet easier. · Keep your bathroom door unlocked while you are in the shower. Where can you learn more? Go to https://VeriTranpeMAD Incubator.Exabre. org and sign in to your Pya Analytics account. Enter [phone] [id number] [phone] in the [facility] box to learn more about \"Preventing Falls: Care Instructions. \"     If you do not have an account, please click on the \"Sign Up Now\" link. Current as of: December 7, 2020               Content Version: 12.8  © 3462-2890 Healthwise, [employer]. Care instructions adapted under license by [facility] ([facility]). If you have questions about a medical condition or this instruction, always ask your healthcare professional. [place] [address] any warranty or liability for your use of this information. Advance Directives: Care Instructions  Overview  An advance directive is a legal way to state your wishes at the end of your life. It tells your family and your doctor what to do if you can't say what you want. There are two main types of advance directives. You can change them any time your wishes change. Living will. This form tells your family and your doctor your wishes about life support and other treatment. The form is also called a declaration. Medical power of . This form lets you name a person to make treatment decisions for you when you can't speak for yourself. This person is called a health care agent (health care proxy, health care surrogate). The form is also called a durable power of  for health care.   If you do not have an advance directive, decisions about your medical care may be made by a family member, or by a doctor or a  who doesn't know you.  It may help to think of an advance directive as a gift to the people who care for you. If you have one, they won't have to make tough decisions by themselves. Follow-up care is a key part of your treatment and safety. Be sure to make and go to all appointments, and call your doctor if you are having problems. It's also a good idea to know your test results and keep a list of the medicines you take. What should you include in an advance directive? Many states have a unique advance directive form. (It may ask you to address specific issues.) Or you might use a universal form that's approved by many states. If your form doesn't tell you what to address, it may be hard to know what to include in your advance directive. Use the questions below to help you get started. · Who do you want to make decisions about your medical care if you are not able to? · What life-support measures do you want if you have a serious illness that gets worse over time or can't be cured? · What are you most afraid of that might happen? (Maybe you're afraid of having pain, losing your independence, or being kept alive by machines.)  · Where would you prefer to die? (Your home? A hospital? A nursing home?)  · Do you want to donate your organs when you die? · Do you want certain Latter-day practices performed before you die? When should you call for help? Be sure to contact your doctor if you have any questions. Where can you learn more? Go to https://The Movie StudiopejanetNitrous.IO.Algenetix. org and sign in to your [email or web address] account. Enter R264 in the [facility] box to learn more about \"Advance Directives: Care Instructions. \"     If you do not have an account, please click on the \"Sign Up Now\" link. Current as of: July 17, 2020               Content Version: 12.8  © 7132-6136 Healthwise, Incorporated. Care instructions adapted under license by [facility] ([facility]).  If you have questions about a medical condition or this instruction, if you don't have a health care agent? If you don't have a health care agent or a living will, you may not get the care you want. Decisions may be made by family members who disagree about your medical care. Or decisions may be made by a medical professional who doesn't know you well. In some cases, a  makes the decisions. When you name a health care agent, it is very clear who has the power to make health decisions for you. How do you name a health care agent? You name your health care agent on a legal form. This form is usually called a medical power of . Ask your hospital, state bar association, or office on aging where to find these forms. You must sign the form to make it legal. Some states require you to get the form notarized. This means that a person called a  watches you sign the form and then he or she signs the form. Some states also require that two or more witnesses sign the form. Be sure to tell your family members and doctors who your health care agent is. Where can you learn more? Go to https://TinkpeMESofteweb.Valor Water Analytics. org and sign in to your Transit App account. Enter [id number] in the [facility] box to learn more about \"Learning About [address]. \"     If you do not have an account, please click on the \"Sign Up Now\" link. Current as of: July 17, 2020               Content Version: 12.8  © 2006-2021 [employer]. Care instructions adapted under license by [facility] ([facility]). If you have questions about a medical condition or this instruction, always ask your healthcare professional. [place] [address] any warranty or liability for your use of this information. Learning About Living Daina Quinn  What is a living will? A living will, also called a declaration, is a legal form. It tells your family and your doctor your wishes when you can't speak for yourself.  It's used by the health professionals who will treat you as you near the end of your life or if you get seriously hurt or ill. If you put your wishes in writing, your loved ones and others will know what kind of care you want. They won't need to guess. This can ease your mind and be helpful to others. And you can change or cancel your living will at any time. A living will is not the same as an estate or property will. An estate will explains what you want to happen with your money and property after you die. How do you use it? A living will is used to describe the kinds of treatment or life support you want as you near the end of your life or if you get seriously hurt or ill. Keep these facts in mind about living gould. · Your living will is used only if you can't speak or make decisions for yourself. Most often, one or more doctors must certify that you can't speak or decide for yourself before your living will takes effect. · If you get better and can speak for yourself again, you can accept or refuse any treatment. It doesn't matter what you said in your living will. · Some states may limit your right to refuse treatment in certain cases. For example, you may need to clearly state in your living will that you don't want artificial hydration and nutrition, such as being fed through a tube. Is a living will a legal document? A living will is a legal document. Each state has its own laws about living gould. And a living will may be called something else in your state. Here are some things to know about living guold. · You don't need an  to complete a living will. But legal advice can be helpful if your state's laws are unclear. It can also help if your health history is complicated or your family can't agree on what should be in your living will. · You can change your living will at any time. Some people find that their wishes about end-of-life care change as their health changes. If you make big changes to your living will, complete a new form. · If you move to another state, make sure that your living will is legal in the state where you now live. In most cases, doctors will respect your wishes even if you have a form from a different state. · You might use a universal form that has been approved by many states. This kind of form can sometimes be filled out and stored online. Your digital copy will then be available wherever you have a connection to the internet. The doctors and nurses who need to treat you can find it right away. · Your state may offer an online registry. This is another place where you can store your living will online. · It's a good idea to get your living will notarized. This means using a person called a [employer] to watch two people sign, or witness, your living will. What should you know when you create a living will? Here are some questions to ask yourself as you make your living will:  · Do you know enough about life support methods that might be used? If not, talk to your doctor so you know what might be done if you can't breathe on your own, your heart stops, or you can't swallow. · What things would you still want to be able to do after you receive life-support methods? Would you want to be able to walk? To speak? To eat on your own? To live without the help of machines? · Do you want certain Yarsanism practices performed if you become very ill? · If you have a choice, where do you want to be cared for? In your home? At a hospital or nursing home? · If you have a choice at the end of your life, where would you prefer to die? At home? In a hospital or nursing home? Somewhere else? · Would you prefer to be buried or cremated? · Do you want your organs to be donated after you die? What should you do with your living will? · Make sure that your family members and your health care agent have copies of your living will (also called a declaration). · Give your doctor a copy of your living will.  Ask him or her to keep it as part of your medical record. If you have more than one doctor, make sure that each one has a copy. · Put a copy of your living will where it can be easily found. For example, some people may put a copy on their refrigerator door. If you are using a digital copy, be sure your doctor, family members, and health care agent know how to find and access it. Where can you learn more? Go to https://[email or web address] Technology [email or web address] and sign in to your EnteroMedics account. Enter M264 in the LinkCloud box to learn more about \"Learning About Living Joelle Chopra. \"     If you do not have an account, please click on the \"Sign Up Now\" link. Current as of: July 17, 2020               Content Version: 12.8  © 6797-3682 Healthwise, [employer]. Care instructions adapted under license by [facility] ([facility]). If you have questions about a medical condition or this instruction, always ask your healthcare professional. [place] [address] any warranty or liability for your use of this information. Personalized Preventive Plan for Karina Zapata - 4/5/2021  Medicare offers a range of preventive health benefits. Some of the tests and screenings are paid in full while other may be subject to a deductible, co-insurance, and/or copay. Some of these benefits include a comprehensive review of your medical history including lifestyle, illnesses that may run in your family, and various assessments and screenings as appropriate. After reviewing your medical record and screening and assessments performed today your provider may have ordered immunizations, labs, imaging, and/or referrals for you. A list of these orders (if applicable) as well as your Preventive Care list are included within your After Visit Summary for your review.     Other Preventive Recommendations:    · A preventive eye exam performed by an eye specialist is recommended every 1-2 years to screen for glaucoma; cataracts, macular degeneration, and other eye disorders. · A preventive dental visit is recommended every 6 months. · Try to get at least 150 minutes of exercise per week or 10,000 steps per day on a pedometer . · Order or download the FREE \"Exercise & Physical Activity: Your Everyday Guide\" from [employer]. Call 1-466.709.5043 or search [employer] online. · You need 9684-5946 mg of calcium and 4065-9157 IU of vitamin D per day. It is possible to meet your calcium requirement with diet alone, but a vitamin D supplement is usually necessary to meet this goal.  · When exposed to the sun, use a sunscreen that protects against both UVA and UVB radiation with an SPF of 30 or greater. Reapply every 2 to 3 hours or after sweating, drying off with a towel, or swimming. · Always wear a seat belt when traveling in a car. Always wear a helmet when riding a bicycle or motorcycle.

## 2021-04-05 NOTE — PROGRESS NOTES
Medicare Annual Wellness Visit  Name: Maria D Every Date: 2021   MRN: [id number] Sex: Female   Age: 78 y.o. Ethnicity: Non-/Non    : 1940 Race: Royer Hunt is here for Medicare AWV (Here primarily for annual wellness visit service), Rash ((NEW complaint today) left pinky finger has a dry scaley patch where the skin is peeling, pt reports some itching and pain associated with it ), and Insomnia (Chronic insomnia on limited doses of Ambien as needed for severe symptoms, for refill today, NARx is reviewed)    Screenings for behavioral, psychosocial and functional/safety risks, and cognitive dysfunction are all negative except as indicated below. These results, as well as other patient data from the [facility]"[employer] [address] form, are documented in Flowsheets linked to this Encounter. Pertinent positives: Negative mini cog negative depression screen. Negative alcohol screen. She does not exercise regularly. She has not had a recent eye exam she does have tripping hazards. She does not report having a living will and not is on file. NARX is reviewed and consistent with ambien 10 tabs/month as we have been prescribing. Allergies   Allergen Reactions    Prevnar 13 [Pneumococcal 13-Latisha Conj Vacc] Rash     Swelling of injection site and upper arm for more than 7 days. No infection.  Amoxicillin Rash    Flexeril [Cyclobenzaprine Hcl] Nausea And Vomiting         Prior to Visit Medications    Medication Sig Taking? Authorizing Provider   zolpidem (AMBIEN) 10 MG tablet Take 0.5 tablets by mouth nightly as needed for Sleep for up to 30 days.  (10 tabs must last 30 days) Yes Román Borjas MD   diclofenac sodium 1 % GEL Apply 2 g topically 4 times daily Yes Román Borjas MD   ELDERBERRY PO Take by mouth Yes Historical Provider, MD   latanoprost (XALATAN) 0.005 % ophthalmic solution  Yes Historical Provider, MD   vitamin B-12 (CYANOCOBALAMIN) 100 MCG tablet Take 50 mcg by 08/31/20 133 lb (60.3 kg)     Vitals:    04/05/21 1022   BP: 112/74   Site: Left Upper Arm   Position: Sitting   Cuff Size: Medium Adult   Pulse: 75   SpO2: 99%   Weight: 140 lb (63.5 kg)   Height: 5' 4\" (1.626 m)     Body mass index is 24.03 kg/m². Based upon direct observation of the patient, evaluation of cognition reveals recent and remote memory intact. Patient's complete Health Risk Assessment and screening values have been reviewed and are found in Flowsheets. The following problems were reviewed today and where indicated follow up appointments were made and/or referrals ordered. Positive Risk Factor Screenings with Interventions:            General Health and ACP:  General  In general, how would you say your health is?: Very Good  In the past 7 days, have you experienced any of the following?  New or Increased Pain, New or Increased Fatigue, Loneliness, Social Isolation, Stress or Anger?: None of These  Do you get the social and emotional support that you need?: Yes  Advance Directives     Power of [address] [place] [address] Will ACP-Advance Directive ACP-Power of     Not on File Not on File Not on File Not on File      General Health Risk Interventions:  · Living Will, MDM updated and information provided from [employer] Habits/Nutrition:  Health Habits/Nutrition  Do you exercise for at least 20 minutes 2-3 times per week?: (!) No  Have you lost any weight without trying in the past 3 months?: No  Do you eat only one meal per day?: No  Have you seen the dentist within the past year?: Yes  Body mass index: 24.03  Health Habits/Nutrition Interventions:  · Inadequate physical activity:  educational materials provided to promote increased physical activity    Hearing/Vision:  No exam data present  Hearing/Vision  Do you or your family notice any trouble with your hearing that hasn't been managed with hearing aids?: No  Do you have difficulty driving, watching TV, or doing any of your daily activities because of your eyesight?: No  Have you had an eye exam within the past year?: (!) No  Hearing/Vision Interventions:  · Vision concerns:  patient encouraged to make appointment with his/her eye specialist    Safety:  Safety  Do you have working smoke detectors?: Yes  Have all throw rugs been removed or fastened?: (!) No  Do you have non-slip mats or surfaces in all bathtubs/showers?: Yes  Do all of your stairways have a railing or banister?: Yes  Are your doorways, halls and stairs free of clutter?: Yes  Do you always fasten your seatbelt when you are in a car?: Yes  Safety Interventions:  · Home safety tips provided     Personalized Preventive Plan   Current Health Maintenance Status  Immunization History   Administered Date(s) Administered    Pneumococcal Conjugate 13-valent ([place]) 07/19/2017    Td, unspecified formulation 12/12/2011        Health Maintenance   Topic Date Due    COVID-19 Vaccine (1) Never done    DTaP/Tdap/Td vaccine (1 - Tdap) 03/24/1957    Shingles Vaccine (1 of 2) Never done   [employer] Visit (AWV)  Never done    Flu vaccine (Season Ended) 09/01/2021    DEXA (modify frequency per FRAX score)  Completed    Hepatitis A vaccine  Aged Out    Hepatitis B vaccine  Aged Out    Hib vaccine  Aged Out    Meningococcal (ACWY) vaccine  Aged Out     Recommendations for [email or web address] Due: see orders and patient instructions/AVS.  . Recommended screening schedule for the next 5-10 years is provided to the patient in written form: see Patient Erika Ace was seen today for medicare awv, rash and insomnia.     Diagnoses and all orders for this visit:    Routine general medical examination at a health care facility    Insomnia, unspecified type

## 2021-04-05 NOTE — PROGRESS NOTES
Chief Complaint   Patient presents with    Medicare AWV     Here primarily for annual wellness visit service    Rash     (NEW complaint today) left pinky finger has a dry scaley patch where the skin is peeling, pt reports some itching and pain associated with it     Insomnia     Chronic insomnia on limited doses of Ambien as needed for severe symptoms, for refill today, NARx is reviewed       [place] NARRATIVE:    Still having some back pain from \"osteoporosis\" and is trying a KT electronic device for her back pain that her son bought her. She also uses OTC medications prn. She has severe chronic insomnia and shows me the 1/3 of the ambien 10 mg tablet left in her bottle that she takes nightly. She believes she has no chance of addiction to this medication and also dismisses my information about increased risk of falls from taking this at age 78. She understands it is not recommended in her age group and if she has a fall I will not be able to continue this medication. NARx is reviewed and appropriate. Rash also as above for several weeks. Also had hiatal hernia surgery recently with Dr. Gi Farias. Patient is established unless otherwise noted. Above chief complaint and [place] obtained by this physician provider. Review of Systems   Constitutional: Negative for activity change, chills, fatigue and fever. HENT: Negative for congestion. Respiratory: Negative for cough, chest tightness, shortness of breath and wheezing. Cardiovascular: Negative for chest pain and leg swelling. Gastrointestinal: Negative for abdominal pain. Musculoskeletal: Negative for back pain and myalgias. Skin: Positive for rash. Pallor: to right 5th finger, peeling skin cracking at joints, some benefit from otc occluded. Psychiatric/Behavioral: Negative for behavioral problems and dysphoric mood.        Allergies   Allergen Reactions    Prevnar 13 [Pneumococcal 13-Latisha Conj Vacc] Rash     Swelling of injection site and upper arm for more than 7 days. No infection.  Amoxicillin Rash    Flexeril [Cyclobenzaprine Hcl] Nausea And Vomiting     Allergy historyupdated. Outpatient Medications Marked as Taking for the 4/5/21 encounter (Office Visit) with Malik Diego MD   Medication Sig Dispense Refill    zolpidem (AMBIEN) 10 MG tablet Take 0.5 tablets by mouth nightly as needed for Sleep for up to 30 days. (10 tabs must last 30 days) 10 tablet 0    betamethasone dipropionate (DIPROLENE) 0.05 % ointment Apply topically twice daily to rash on finger. 45 g 1    diclofenac sodium 1 % GEL Apply 2 g topically 4 times daily 2 Tube 1    ELDERBERRY PO Take by mouth      latanoprost (XALATAN) 0.005 % ophthalmic solution       vitamin B-12 (CYANOCOBALAMIN) 100 MCG tablet Take 50 mcg by mouth daily      Ascorbic Acid (VITAMIN C) 500 MG tablet Take 500 mg by mouth daily      CINNAMON PO Take by mouth daily      Calcium Citrate-Vitamin D (CALCIUM + D PO) Take by mouth daily         Tobacco use history updated. Social History     Tobacco Use   Smoking Status Never Smoker   Smokeless Tobacco Never Used        Nursing note reviewed. Vitals:    04/05/21 1022   BP: 112/74   Site: Left Upper Arm   Position: Sitting   Cuff Size: Medium Adult   Pulse: 75   SpO2: 99%   Weight: 140 lb (63.5 kg)   Height: 5' 4\" (1.626 m)          BP Readings from Last 3 Encounters:   04/05/21 112/74   08/31/20 (!) 156/90   03/05/20 128/84     Wt Readings from Last 3 Encounters:   04/05/21 140 lb (63.5 kg)   09/30/20 132 lb (59.9 kg)   08/31/20 133 lb (60.3 kg)     Body mass index is 24.03 kg/m². No results found for this visit on 04/05/21. Physical Exam  Vitals signs and nursing note reviewed. Constitutional:       General: She is not in acute distress. Appearance: She is well-developed. She is not diaphoretic. HENT:      Head: Normocephalic and atraumatic.    Eyes:      Conjunctiva/sclera: Conjunctivae normal.      Pupils: Pupils are equal, round, and reactive to light. Cardiovascular:      Rate and Rhythm: Normal rate and regular rhythm. Heart sounds: Normal heart sounds. No murmur. Pulmonary:      Effort: Pulmonary effort is normal. No respiratory distress. Breath sounds: Normal breath sounds. No wheezing. Skin:     General: Skin is warm and dry. Neurological:      Mental Status: She is alert and oriented to person, place, and time. _________________________________________________  Assessment:     1. Routine general medical examination at a health care facility  2. Insomnia, unspecified type  -     zolpidem (AMBIEN) 10 MG tablet; Take 0.5 tablets by mouth nightly as needed for Sleep for up to 30 days. (10 tabs must last 30 days), Disp-10 tablet, R-0Normal  3. Fingertip eczema  -     betamethasone dipropionate (DIPROLENE) 0.05 % ointment; Apply topically twice daily to rash on finger. , Disp-45 g, R-1, Normal    Problems listed above are stable and therapeutic plan is unchanged unless otherwise specified. See orders above and comments below for details of workup or medication orders. _________________________________________________  Plan:     Return in 1 year (on 4/5/2022), or if symptoms worsen or fail to improve, for Medicare Annual Wellness Visit in 1 year.       Electronically signed by Jerzy Pastrana MD on 4/5/21 at 10:56 AM EDT

## 2021-05-04 DIAGNOSIS — G47.00 INSOMNIA, UNSPECIFIED TYPE: ICD-10-CM

## 2021-05-04 RX ORDER — ZOLPIDEM TARTRATE 10 MG/1
5 TABLET ORAL NIGHTLY PRN
Qty: 10 TABLET | Refills: 2 | Status: SHIPPED | OUTPATIENT
Start: 2021-05-04 | End: 2021-08-05 | Stop reason: SDUPTHER

## 2021-06-25 ENCOUNTER — OFFICE VISIT (OUTPATIENT)
Dept: FAMILY MEDICINE CLINIC | Age: 81
End: 2021-06-25
Payer: MEDICARE

## 2021-06-25 ENCOUNTER — HOSPITAL ENCOUNTER (OUTPATIENT)
Age: 81
Discharge: HOME | End: 2021-06-25
Payer: MEDICARE

## 2021-06-25 ENCOUNTER — TELEPHONE (OUTPATIENT)
Dept: FAMILY MEDICINE CLINIC | Age: 81
End: 2021-06-25

## 2021-06-25 ENCOUNTER — HOSPITAL ENCOUNTER (OUTPATIENT)
Dept: GENERAL RADIOLOGY | Age: 81
Discharge: HOME | End: 2021-06-25
Payer: MEDICARE

## 2021-06-25 VITALS
OXYGEN SATURATION: 98 % | DIASTOLIC BLOOD PRESSURE: 84 MMHG | BODY MASS INDEX: 24.37 KG/M2 | SYSTOLIC BLOOD PRESSURE: 122 MMHG | HEART RATE: 67 BPM | WEIGHT: 142 LBS

## 2021-06-25 DIAGNOSIS — Z28.21 PNEUMOCOCCAL VACCINE REFUSED: ICD-10-CM

## 2021-06-25 DIAGNOSIS — R20.2 PARESTHESIA OF RIGHT ARM: ICD-10-CM

## 2021-06-25 DIAGNOSIS — M54.2 CERVICALGIA: Primary | ICD-10-CM

## 2021-06-25 DIAGNOSIS — Z28.21 COVID-19 VACCINATION DECLINED: ICD-10-CM

## 2021-06-25 DIAGNOSIS — R55 PRE-SYNCOPE: ICD-10-CM

## 2021-06-25 DIAGNOSIS — M54.2 CERVICALGIA: ICD-10-CM

## 2021-06-25 PROCEDURE — G8420 CALC BMI NORM PARAMETERS: HCPCS | Performed by: FAMILY MEDICINE

## 2021-06-25 PROCEDURE — 1036F TOBACCO NON-USER: CPT | Performed by: FAMILY MEDICINE

## 2021-06-25 PROCEDURE — G8399 PT W/DXA RESULTS DOCUMENT: HCPCS | Performed by: FAMILY MEDICINE

## 2021-06-25 PROCEDURE — 1090F PRES/ABSN URINE INCON ASSESS: CPT | Performed by: FAMILY MEDICINE

## 2021-06-25 PROCEDURE — 4040F PNEUMOC VAC/ADMIN/RCVD: CPT | Performed by: FAMILY MEDICINE

## 2021-06-25 PROCEDURE — 99214 OFFICE O/P EST MOD 30 MIN: CPT | Performed by: FAMILY MEDICINE

## 2021-06-25 PROCEDURE — G8427 DOCREV CUR MEDS BY ELIG CLIN: HCPCS | Performed by: FAMILY MEDICINE

## 2021-06-25 PROCEDURE — 1123F ACP DISCUSS/DSCN MKR DOCD: CPT | Performed by: FAMILY MEDICINE

## 2021-06-25 PROCEDURE — 72052 X-RAY EXAM NECK SPINE 6/>VWS: CPT

## 2021-06-25 RX ORDER — ZOSTER VACCINE RECOMBINANT, ADJUVANTED 50 MCG/0.5
0.5 KIT INTRAMUSCULAR SEE ADMIN INSTRUCTIONS
Qty: 0.5 ML | Refills: 0 | Status: SHIPPED | OUTPATIENT
Start: 2021-06-25 | End: 2021-08-25

## 2021-06-25 NOTE — PROGRESS NOTES
Chief Complaint   Patient presents with    Hand Pain     Patient states having pins and needles in her right hand and the pain goes up the arm    Dizziness     reports she is babaysitting her grandson who is a toddler and she is worried about 3-4 episodes in last two weeks where she felt like she was \"going to pass out       [place] NARRATIVE:    Starting with pain at base of neck in the back, around shoulder and into anterior shoulder. Cardio eval was negative, she had already gone to them before today. Tingling in 2nd and third fingers and up arm and into upper arm even. Arm sx come and go. No injury or overuse. Saw chiro also who wanted to start therapy but she declined, probably wise as she describes high-velocity maneuvers for previous neck treatments. She is seeing chiro for low back treatments monthly with good benefit. She is not aware of neck arthritis but it would not surprise me. Having trouble opening jars and using hand. Righthanded. Also presyncope in store last week x 2, and other epsiodes at home. No chest pain or palpitations, these are lasting a few seconds. She did have recent extensive cardiac workup. No actual syncope or seizure or neuro or cardiac sx. She denies the possibility of dehydration pr low blood sugar. Patient is established unless otherwise noted. Above chief complaint and [place] obtained by this physician provider. Review of Systems   Constitutional: Negative for activity change, chills, fatigue and fever. HENT: Negative for congestion. Respiratory: Negative for cough, chest tightness, shortness of breath and wheezing. Cardiovascular: Negative for chest pain and leg swelling. Gastrointestinal: Negative for abdominal pain. Musculoskeletal: Negative for back pain and myalgias. Skin: Negative for rash. Neurological: Positive for syncope (presyncope episodes) and numbness (and tingling in arm).    Psychiatric/Behavioral: Negative for behavioral problems and dysphoric mood. Allergies   Allergen Reactions    Prevnar 13 [Pneumococcal 13-Latisha Conj Vacc] Rash     Swelling of injection site and upper arm for more than 7 days. No infection.  Amoxicillin Rash    Flexeril [Cyclobenzaprine Hcl] Nausea And Vomiting     Allergy historyupdated. Outpatient Medications Marked as Taking for the 6/25/21 encounter (Office Visit) with Shalini Hernandez MD   Medication Sig Dispense Refill    zoster recombinant adjuvanted vaccine [facility]) 50 MCG/0.5ML SUSR injection Inject 0.5 mLs into the muscle See Admin Instructions 1 dose now and repeat in 2-6 months 0.5 mL 0    zolpidem (AMBIEN) 10 MG tablet Take 0.5 tablets by mouth nightly as needed for Sleep for up to 90 days. PLEASE DISPENSE ONLY 10 EVERY 30 DAYS DUE TO AGE AND FALLS RISK 10 tablet 2    betamethasone dipropionate (DIPROLENE) 0.05 % ointment Apply topically twice daily to rash on finger. 45 g 1    diclofenac sodium 1 % GEL Apply 2 g topically 4 times daily 2 Tube 1    ELDERBERRY PO Take by mouth      latanoprost (XALATAN) 0.005 % ophthalmic solution       vitamin B-12 (CYANOCOBALAMIN) 100 MCG tablet Take 50 mcg by mouth daily      Ascorbic Acid (VITAMIN C) 500 MG tablet Take 500 mg by mouth daily      CINNAMON PO Take by mouth daily      Multiple Vitamin (MULTI VITAMIN DAILY PO) Take by mouth daily      Calcium Citrate-Vitamin D (CALCIUM + D PO) Take by mouth daily         Tobacco use history updated. Social History     Tobacco Use   Smoking Status Never Smoker   Smokeless Tobacco Never Used        Nursing note reviewed.     Vitals:    06/25/21 0950   BP: 122/84   Site: Left Upper Arm   Position: Sitting   Cuff Size: Medium Adult   Pulse: 67   SpO2: 98%   Weight: 142 lb (64.4 kg)          BP Readings from Last 3 Encounters:   06/25/21 122/84   04/05/21 112/74   08/31/20 (!) 156/90     Wt Readings from Last 3 Encounters:   06/25/21 142 lb (64.4 kg)   04/05/21 140 lb (63.5 kg)   09/30/20 132 lb (59.9 kg)     Body mass index is 24.37 kg/m². No results found for this visit on 06/25/21. Physical Exam  Vitals and nursing note reviewed. Constitutional:       General: She is not in acute distress. Appearance: She is well-developed. She is not diaphoretic. HENT:      Head: Normocephalic. Eyes:      General:         Right eye: No discharge. Left eye: No discharge. Conjunctiva/sclera: Conjunctivae normal.      Pupils: Pupils are equal, round, and reactive to light. Cardiovascular:      Rate and Rhythm: Normal rate and regular rhythm. Heart sounds: Normal heart sounds. No murmur heard. Pulmonary:      Effort: Pulmonary effort is normal. No respiratory distress. Breath sounds: Normal breath sounds. No wheezing or rales. Musculoskeletal:      Cervical back: Normal range of motion. Skin:     General: Skin is warm and dry. Neurological:      General: No focal deficit present. Mental Status: She is alert and oriented to person, place, and time. Sensory: No sensory deficit. Motor: No weakness. Gait: Gait normal.      Deep Tendon Reflexes: Reflexes abnormal.   Psychiatric:         Behavior: Behavior normal.           _________________________________________________  Assessment:     1. Cervicalgia  -     XR CERVICAL SPINE W OBLIQUES FLEXION AND EXTENSION; Future  -     ID NEEDLE EMG EA EXTREMTY W/PARASPINL AREA COMPLETE; Future  2. Paresthesia of right arm  -     XR CERVICAL SPINE W OBLIQUES FLEXION AND EXTENSION; Future  -     ID NEEDLE EMG EA EXTREMTY W/PARASPINL AREA COMPLETE; Future  3. Pre-syncope  4. Pneumococcal vaccine refused  5. COVID-19 vaccination declined    So for arm and neck pain I am suspicious for peripheral nerve compression, we will check plain films of neck. I also ordered EMG of RUE and explained that test to her. It should identify the source of nerve compression symptoms.      Regarding near syncope patient is to use caution

## 2021-06-26 PROBLEM — Z28.21 PNEUMOCOCCAL VACCINE REFUSED: Status: ACTIVE | Noted: 2021-06-26

## 2021-06-26 PROBLEM — Z28.21 COVID-19 VACCINATION DECLINED: Status: ACTIVE | Noted: 2021-06-26

## 2021-06-26 ASSESSMENT — ENCOUNTER SYMPTOMS
CHEST TIGHTNESS: 0
WHEEZING: 0
COUGH: 0
BACK PAIN: 0
ABDOMINAL PAIN: 0
SHORTNESS OF BREATH: 0

## 2021-06-29 ENCOUNTER — TELEPHONE (OUTPATIENT)
Dept: FAMILY MEDICINE CLINIC | Age: 81
End: 2021-06-29

## 2021-06-29 DIAGNOSIS — R20.2 PARESTHESIA OF RIGHT ARM: ICD-10-CM

## 2021-06-29 DIAGNOSIS — M54.2 CERVICALGIA: Primary | ICD-10-CM

## 2021-07-16 ENCOUNTER — TELEPHONE (OUTPATIENT)
Dept: FAMILY MEDICINE CLINIC | Age: 81
End: 2021-07-16

## 2021-07-16 NOTE — TELEPHONE ENCOUNTER
Junie  Request for PA recieved via fax. Accessed chart to verjudiy PA need.  Outcome:  PA needed & initiated awaiting response

## 2021-07-29 ENCOUNTER — OFFICE VISIT (OUTPATIENT)
Dept: PHYSICAL MEDICINE AND REHAB | Age: 81
End: 2021-07-29
Payer: MEDICARE

## 2021-07-29 ENCOUNTER — TELEPHONE (OUTPATIENT)
Dept: FAMILY MEDICINE CLINIC | Age: 81
End: 2021-07-29

## 2021-07-29 VITALS — TEMPERATURE: 97.3 F

## 2021-07-29 DIAGNOSIS — M79.602 PARESTHESIA AND PAIN OF BOTH UPPER EXTREMITIES: ICD-10-CM

## 2021-07-29 DIAGNOSIS — R20.2 PARESTHESIA AND PAIN OF BOTH UPPER EXTREMITIES: ICD-10-CM

## 2021-07-29 DIAGNOSIS — R20.2 PARESTHESIA OF RIGHT ARM: Primary | ICD-10-CM

## 2021-07-29 DIAGNOSIS — M79.601 PARESTHESIA AND PAIN OF BOTH UPPER EXTREMITIES: ICD-10-CM

## 2021-07-29 DIAGNOSIS — G56.01 CARPAL TUNNEL SYNDROME OF RIGHT WRIST: Primary | ICD-10-CM

## 2021-07-29 DIAGNOSIS — G56.01 RIGHT CARPAL TUNNEL SYNDROME: ICD-10-CM

## 2021-07-29 PROCEDURE — 95911 NRV CNDJ TEST 9-10 STUDIES: CPT | Performed by: PHYSICAL MEDICINE & REHABILITATION

## 2021-07-29 PROCEDURE — 95886 MUSC TEST DONE W/N TEST COMP: CPT | Performed by: PHYSICAL MEDICINE & REHABILITATION

## 2021-07-29 NOTE — TELEPHONE ENCOUNTER
EMG report consistent with right carpal tunnel syndrome. No cervical radiculopathy. Possible myofascial pain in the upper back.

## 2021-07-29 NOTE — LETTER
July 29, 2021        Patt Carter MD  [address]  [address] [facility]  [address] [facility]        Patient Name: Mirian Evans   MRN Number: [id number]   YOB: 1940   Date Of Visit: 07/29/2021        Dear Patt Carter MD,      Thank you for referring Mirian Evans to me for electrodiagnostic testing. Attached are the findings of the EMG and my assessment. If you have any further questions, please do not hesitate to call me. Thank you for this interesting referral.      Sincerely,          STEPHANIE Srivastava MD.

## 2021-08-02 DIAGNOSIS — G47.00 INSOMNIA, UNSPECIFIED TYPE: ICD-10-CM

## 2021-08-05 RX ORDER — ZOLPIDEM TARTRATE 10 MG/1
5 TABLET ORAL NIGHTLY PRN
Qty: 10 TABLET | Refills: 2 | Status: SHIPPED | OUTPATIENT
Start: 2021-08-05 | End: 2021-11-01 | Stop reason: SDUPTHER

## 2021-08-17 ENCOUNTER — OFFICE VISIT (OUTPATIENT)
Dept: ORTHOPEDIC SURGERY | Age: 81
End: 2021-08-17
Payer: MEDICARE

## 2021-08-17 ENCOUNTER — TELEPHONE (OUTPATIENT)
Dept: ORTHOPEDIC SURGERY | Age: 81
End: 2021-08-17

## 2021-08-17 VITALS
WEIGHT: 138.5 LBS | OXYGEN SATURATION: 98 % | HEIGHT: 64 IN | BODY MASS INDEX: 23.64 KG/M2 | RESPIRATION RATE: 14 BRPM | HEART RATE: 78 BPM

## 2021-08-17 DIAGNOSIS — G56.01 RIGHT CARPAL TUNNEL SYNDROME: Primary | ICD-10-CM

## 2021-08-17 DIAGNOSIS — M18.11 LOCALIZED PRIMARY OSTEOARTHRITIS OF CARPOMETACARPAL JOINT OF RIGHT THUMB: ICD-10-CM

## 2021-08-17 PROCEDURE — 4040F PNEUMOC VAC/ADMIN/RCVD: CPT | Performed by: ORTHOPAEDIC SURGERY

## 2021-08-17 PROCEDURE — G8427 DOCREV CUR MEDS BY ELIG CLIN: HCPCS | Performed by: ORTHOPAEDIC SURGERY

## 2021-08-17 PROCEDURE — G8399 PT W/DXA RESULTS DOCUMENT: HCPCS | Performed by: ORTHOPAEDIC SURGERY

## 2021-08-17 PROCEDURE — 99203 OFFICE O/P NEW LOW 30 MIN: CPT | Performed by: ORTHOPAEDIC SURGERY

## 2021-08-17 PROCEDURE — G8420 CALC BMI NORM PARAMETERS: HCPCS | Performed by: ORTHOPAEDIC SURGERY

## 2021-08-17 PROCEDURE — 1090F PRES/ABSN URINE INCON ASSESS: CPT | Performed by: ORTHOPAEDIC SURGERY

## 2021-08-17 PROCEDURE — 1036F TOBACCO NON-USER: CPT | Performed by: ORTHOPAEDIC SURGERY

## 2021-08-17 PROCEDURE — 1123F ACP DISCUSS/DSCN MKR DOCD: CPT | Performed by: ORTHOPAEDIC SURGERY

## 2021-08-17 NOTE — PATIENT INSTRUCTIONS
We will schedule surgery at soonest convenience. If you have any questions regarding your surgery please call our office and ask to speak with Eliza.  [phone]    Surgery: right carpal tunnel release(local)

## 2021-08-17 NOTE — PROGRESS NOTES
Patient presents to the office for a consultation to address her right wrist carpal tunnel and paresthesia of the right arm that began over a month ago. No previous injuries or surgeries, but patient has had steroid injections administered by Dr. Columba Padilla for her symptoms with the first injection providing significant relief and the second injection not as effective. Pain is described as a shooting pain that radiates from the thumb and index finger up the arm towards the shoulder with symptoms becoming aggravated by rotational movement, repetitive activities, or lifting. Symptoms have now begun to affect her  and grasp and she takes Ibuprofen for symptoms relief. Recent EMG completed on 7/29/21 revealed results as follows:      EMG  IMPRESSION:                  1.  Abnormal EMG. 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                2.  No evidence of a focal cervical spinal nerve root injury (radiculopathy) despite a very convincing history.                 3.  No signs of a concurrent cervical plexopathy, generalized neuropathy or primary muscle disease.                   Clinically, patient has some arthralgias at the first [facility] and some features of some reactive myofascial pain through the shoulder girdle muscles, possibly in response to cervical spondylosis.

## 2021-08-18 DIAGNOSIS — G56.01 CARPAL TUNNEL SYNDROME ON RIGHT: ICD-10-CM

## 2021-08-18 DIAGNOSIS — Z01.818 PRE-OP TESTING: Primary | ICD-10-CM

## 2021-08-22 PROBLEM — M18.11 LOCALIZED PRIMARY OSTEOARTHRITIS OF CARPOMETACARPAL JOINT OF RIGHT THUMB: Status: ACTIVE | Noted: 2021-08-22

## 2021-08-22 PROBLEM — G56.01 RIGHT CARPAL TUNNEL SYNDROME: Status: ACTIVE | Noted: 2021-08-22

## 2021-08-22 NOTE — PROGRESS NOTES
8/17/2021   Chief Complaint   Patient presents with    Wrist Pain     right wrist carpal tunnel         History of Present Illness: Neftaly Gaitan is a 78 y.o. female who presents today for evaluation of her right hand pain numbness and tingling. She has symptoms that radiate out into the radial 3 digits. She has constant numbness and tingling in the fingertips and discussed worse with repetitive activities. She also has pain wakes her up at night. She has a history of previous degenerative joint disease of the basal joint of her right thumb which has been treated in the past with braces and injections. This is a chronic issue which she has dealt with conservatively. Unfortunately her numbness and tingling and weakness issues have not significantly progressed over the last few months and she had an EMG nerve conduction velocity test ordered. Patient presents to the office for a consultation to address her right wrist carpal tunnel and paresthesia of the right arm that began over a month ago. No previous injuries or surgeries, but patient has had steroid injections administered by Dr. Dmitri Atkinson for her symptoms with the first injection providing significant relief and the second injection not as effective. Pain is described as a shooting pain that radiates from the thumb and index finger up the arm towards the shoulder with symptoms becoming aggravated by rotational movement, repetitive activities, or lifting. Symptoms have now begun to affect her  and grasp and she takes Ibuprofen for symptoms relief. Recent EMG completed on 7/29/21 revealed results as follows:        EMG  IMPRESSION:                  1.  Abnormal EMG. 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                  2.  No evidence of a focal cervical spinal nerve root injury (radiculopathy) despite a very convincing history.                 3.  No signs of a concurrent cervical plexopathy, generalized neuropathy or primary muscle disease.                   Clinically, patient has some arthralgias at the first Aia 16 joint and some features of some reactive myofascial pain through the shoulder girdle muscles, possibly in response to cervical spondylosis. Medical History  Patient's medications, allergies, past medical, surgical, social and family histories were reviewed and updated as appropriate. Past Medical History:   Diagnosis Date    [facility]     Dysuria     Edema     Female bladder prolapse     Hernia, hiatal     Insomnia     Lipoma of neck     Lumbago     Nodule of kidney     Osteoarthritis     Plantar fasciitis     PVD [facility]peripheral vascular disease) ([facility]     Sinusitis     Varicose veins of both lower extremities 2014    Dr Ean Hitchcock     Past Surgical History:   Procedure Laterality Date    BLADDER REPAIR  2005    HERNIA REPAIR Bilateral     Inguinal hernia repairs    HIATAL HERNIA REPAIR  07/2020    HYSTERECTOMY, TOTAL ABDOMINAL  10/10/2015    cervix ca-adenoid basal epithelioma    KNEE ARTHROSCOPY Left 04/07/2014    Dr Joseph Zambrano     Family History   Problem Relation Age of Onset    Diabetes Mother     Colon Cancer Mother     Heart Failure Father     Cancer Sister         tonsil, galbladder    Diabetes Sister     Heart Failure Sister     Hypertension Sister    [facility] Migraines Sister     Diabetes Brother     Heart Failure Brother     Cancer Other         several aunts and uncles    Breast Cancer Brother      Social History     Socioeconomic History    Marital status:       Spouse name: None    Number of children: None    Years of education: None    Highest education level: None   Occupational History    None   Tobacco Use    Smoking status: Never Smoker    Smokeless tobacco: Never Used   Vaping Use    Vaping Use: Never used   Substance and Sexual Activity    Alcohol use: No    Drug use: No    Sexual activity: Never   Other Topics Concern    None   Social History Narrative    None     Social Determinants of Health     Financial Resource Strain:     Difficulty of Paying Living Expenses:    Food Insecurity:     Worried About Running Out of Food in the Last Year:     [address] in the Last Year:    Transportation Needs:     Lack of Transportation (Medical):  Lack of Transportation (Non-Medical):    Physical Activity:     Days of Exercise per Week:     Minutes of Exercise per Session:    Stress:     Feeling of Stress :    Social Connections:     Frequency of Communication with Friends and Family:     Frequency of Social Gatherings with Friends and Family:     Attends [employer] Services:     Active Member of Clubs or Organizations:     Attends Club or Organization Meetings:     Marital Status:    Intimate Partner Violence:     Fear of Current or Ex-Partner:     Emotionally Abused:     Physically Abused:     Sexually Abused:      Current Outpatient Medications   Medication Sig Dispense Refill    zolpidem (AMBIEN) 10 MG tablet Take 0.5 tablets by mouth nightly as needed for Sleep for up to 90 days. PLEASE DISPENSE ONLY 10 EVERY 30 DAYS DUE TO AGE AND FALLS RISK 10 tablet 2    zoster recombinant adjuvanted vaccine (SHINGRIX) 50 MCG/0.5ML SUSR injection Inject 0.5 mLs into the muscle See Admin Instructions 1 dose now and repeat in 2-6 months 0.5 mL 0    ELDERBERRY PO Take by mouth      latanoprost (XALATAN) 0.005 % ophthalmic solution       vitamin B-12 (CYANOCOBALAMIN) 100 MCG tablet Take 50 mcg by mouth daily      Ascorbic Acid (VITAMIN C) 500 MG tablet Take 500 mg by mouth daily      CINNAMON PO Take by mouth daily      Multiple Vitamin (MULTI VITAMIN DAILY PO) Take by mouth daily      Calcium Citrate-Vitamin D (CALCIUM + D PO) Take by mouth daily      betamethasone dipropionate (DIPROLENE) 0.05 % ointment Apply topically twice daily to rash on finger.  (Patient not taking: Reported on flexor pollicis brevis. Range of motion of the wrist and fingers is intact without limitation. Skin is intact without wounds or rash. 2+ radial pulse with brisk cap refill throughout the fingers. No atrophy of the thenar musculature. Strength 5/5 in finger and wrist flexion and extension. There is moderate tenderness to palpation at the thumb [facility] joint with a positive grind test and prominent spurring present. There is moderately restricted range of motion across the thumb [facility] joint. Pain referred to the thumb [facility] joint. Heavy pinch and  with thumb. Skin:     General: Skin is warm and dry. Neurological:      Mental Status: She is alert and oriented to person, place, and time. Psychiatric:         Mood and Affect: Mood normal.         Behavior: Behavior normal.            Diagnostic testing:  X-ray images were reviewed by myself and discussed with the patient:  3 views of the right wrist show evidence of advanced degenerative joint    disease at the thumb carpometacarpal joint.  There is advanced joint space    narrowing and extensive subchondral sclerosis with mild spurring present    both sides of the joint.  There is also evidence of mild diffuse    degenerative changes present throughout the carpal articulations including    cystic changes of the lunate.  There is diffuse decreased bone mineral    density.  There is normal alignment of the wrist and carpal articulations    with the exception of mild subluxation across the base of the first    metacarpal at the thumb [facility] joint.       No evidence of fracture or acute abnormality         Office Procedures:  No orders of the defined types were placed in this encounter. Assessment and Plan  1. Right carpal tunnel syndrome    2. Right thumb carpometacarpal joint primary osteoarthritis    The exam and history are consistent with carpal syndrome. We discussed treatment options for this.   Conservative measures have not been successful, including activity modification, rest, over-the-counter anti-inflammatory medications, and night bracing. Due to the severity of symptoms I have recommended surgical intervention. This will consist of a carpal tunnel release. We discussed the findings of the EMG nerve conduction velocity test.  I explained that I would expect many of her nerve symptoms to improve following carpal tunnel release. We discussed the potential for ongoing pain and stiffness and dysfunction at the basal joint of her thumb given the severity of her degenerative joint disease at the same area. She may benefit in the future from hand therapy, custom bracing, and injections to the thumb basal joint even after successful improvement following carpal tunnel release. We discussed the risks, benefits, and alternatives to surgery as well as the postoperative course. Risks include persistent pain, numbness, weakness, infection, stiffness, recurrence, and incomplete recovery of the nerve. The patient understands and would like to proceed with carpal tunnel release.     Plan will be to proceed with carpal tunnel release under local anesthesia      Electronically signed by Cindy Thompson MD on 8/22/2021 at 9:57 AM

## 2021-08-23 ENCOUNTER — HOSPITAL ENCOUNTER (OUTPATIENT)
Dept: LAB | Age: 81
Discharge: HOME | End: 2021-08-23
Payer: MEDICARE

## 2021-08-23 PROCEDURE — U0003 INFECTIOUS AGENT DETECTION BY NUCLEIC ACID (DNA OR RNA); SEVERE ACUTE RESPIRATORY SYNDROME CORONAVIRUS 2 (SARS-COV-2) (CORONAVIRUS DISEASE [COVID-19]), AMPLIFIED PROBE TECHNIQUE, MAKING USE OF HIGH THROUGHPUT TECHNOLOGIES AS DESCRIBED BY CMS-2020-01-R: HCPCS

## 2021-08-23 PROCEDURE — U0005 INFEC AGEN DETEC AMPLI PROBE: HCPCS

## 2021-08-24 LAB — SARS-COV-2: NOT DETECTED

## 2021-08-25 RX ORDER — ZINC SULFATE 50(220)MG
50 CAPSULE ORAL DAILY
COMMUNITY
End: 2022-04-11

## 2021-08-25 NOTE — PROGRESS NOTES
.Surgery 8/30/21, you will be called 8/29/21 with times               1. You may eat a light breakfast, do not eat anything within 2 hours of your procedure. 2. Follow your directions as prescribed by the doctor for your procedure and medications. 3. Check with your Doctor regarding stopping Plavix, Coumadin, Lovenox,Effient,Pradaxa,Xarelto, Fragmin or other blood thinners and                   follow their instructions. 4. Do not smoke, and do not drink any alcoholic beverages 24 hours prior to surgery. This includes NA Beer. 5. You may brush your teeth and gargle the morning of surgery. DO NOT SWALLOW WATER   6. You MUST make arrangements for a responsible adult to take you home after your surgery and be able to check on you every couple                   hours for the day. You will not be allowed to leave alone or drive yourself home. It is strongly suggested someone stay with you the first 24                   hrs. Your surgery will be cancelled if you do not have a ride home. 7. Please wear simple, loose fitting clothing to the hospital.  Amanda Tom not bring valuables (money, credit cards, checkbooks, etc.) Do not wear any                   makeup (including no eye makeup) or nail polish on your fingers or toes. 8. DO NOT wear any jewelry or piercings on day of surgery. All body piercing jewelry must be removed. 9. If you have dentures, they will be removed before going to the OR; we will provide you a container. If you wear contact lenses or glasses,                  they will be removed; please bring a case for them. 10. If you  have a Living Will and Durable Power of  for Healthcare, please bring in a copy. 11. Please bring picture ID,  insurance card, paperwork from the doctors office    (H & P, Consent, & card for implantable devices).            12. Take a shower the night before or morning of your procedure, do not apply any lotion, oil or powder. 13. Wear a mask covering your nose & mouth when entering the hospital. Negative covid test 8/23/21. Quarantine yourself after the test until after your surgery.

## 2021-08-30 ENCOUNTER — HOSPITAL ENCOUNTER (OUTPATIENT)
Age: 81
Setting detail: OUTPATIENT SURGERY
Discharge: HOME | End: 2021-08-30
Attending: ORTHOPAEDIC SURGERY | Admitting: ORTHOPAEDIC SURGERY
Payer: MEDICARE

## 2021-08-30 VITALS
TEMPERATURE: 96.4 F | DIASTOLIC BLOOD PRESSURE: 82 MMHG | OXYGEN SATURATION: 96 % | RESPIRATION RATE: 16 BRPM | SYSTOLIC BLOOD PRESSURE: 162 MMHG | HEART RATE: 61 BPM | HEIGHT: 64 IN | BODY MASS INDEX: 23.9 KG/M2 | WEIGHT: 140 LBS

## 2021-08-30 PROCEDURE — 64721 CARPAL TUNNEL SURGERY: CPT | Performed by: ORTHOPAEDIC SURGERY

## 2021-08-30 PROCEDURE — 2709999900 HC NON-CHARGEABLE SUPPLY: Performed by: ORTHOPAEDIC SURGERY

## 2021-08-30 PROCEDURE — 7100000011 HC PHASE II RECOVERY - ADDTL 15 MIN: Performed by: ORTHOPAEDIC SURGERY

## 2021-08-30 PROCEDURE — 7100000010 HC PHASE II RECOVERY - FIRST 15 MIN: Performed by: ORTHOPAEDIC SURGERY

## 2021-08-30 PROCEDURE — 2500000003 HC RX 250 WO HCPCS: Performed by: ORTHOPAEDIC SURGERY

## 2021-08-30 PROCEDURE — 3600000012 HC SURGERY LEVEL 2 ADDTL 15MIN: Performed by: ORTHOPAEDIC SURGERY

## 2021-08-30 PROCEDURE — 3600000002 HC SURGERY LEVEL 2 BASE: Performed by: ORTHOPAEDIC SURGERY

## 2021-08-30 ASSESSMENT — PAIN SCALES - GENERAL: PAINLEVEL_OUTOF10: 0

## 2021-08-30 NOTE — OP NOTE
Operative Note      Patient: Danny Valdez  YOB: 1940  MRN: [id number]    Date of Procedure: 8/30/2021    Pre-Op Diagnosis: RIGHT CARPAL TUNNEL SYNDROME    Post-Op Diagnosis: Same       Procedure(s):  RIGHT CARPAL TUNNEL RELEASE    Surgeon(s):  Jayden Bond MD    Assistant:   * No surgical staff found *    Anesthesia: Local    Estimated Blood Loss (mL): Minimal    Complications: None    Specimens:   * No specimens in log *    Implants:  * No implants in log *      Drains: * No LDAs found *    Findings:     Detailed Description of Procedure:   PREOPERATIVE DIAGNOSIS:  Right carpal tunnel syndrome. POSTOPERATIVE DIAGNOSIS:  Right carpal tunnel syndrome. PROCEDURE:  Right carpal tunnel release. SURGEON:  Jayden Bond MD     ANESTHESIA:  Local     ESTIMATED BLOOD LOSS:  Minimal.     COMPLICATIONS:  None. DISPOSITION:  To PACU in stable condition. PREOPERATIVE INDICATIONS:  The patient has had  severe worsening numbness and pain and tingling in the right hand in the  median nerve distribution. Conservative treatments have failed, and  we had a long discussion regarding further options and I recommended  surgical intervention for carpal tunnel release. We discussed the  risks, benefits, and alternatives to surgery as well as the  postoperative course. The patient understood this and wished to proceed and  consent was obtained. DESCRIPTION OF PROCEDURE:  The patient was identified in the  preoperative area and the site was marked. The patient was taken back to the  operating room and a hand table was attached to the cart. The right  upper extremity was prepped with alcohol and then the surgical site was  injected with a mixture of 5 mL of 1% plain lidocaine and 5 mL of 0.5%  Marcaine with epinephrine. The right upper extremity was then prepped  and draped in sterile fashion with an arm tourniquet. Timeout was  performed. Preoperative antibiotics were given.   The arm was  exsanguinated with an Esmarch and tourniquet inflated to 250 mmHg and  deflated at the conclusion of the case after less than 10 minutes of  tourniquet time. An incision was made overlying the carpal tunnel along the mid aspect  and dissection was carried down through subcutaneous tissues and palmar  fascia was identified and divided and retracted. This exposed the transverse  carpal ligament. The ligament was divided under direct visualization  with a 15-blade and the release was carried out distally to the level of  the palmar fat and then proximally into the distal forearm fascia. The  two ends of the transverse carpal ligament retracted nicely  revealing the healthy-appearing median nerve, which was protected  throughout the case. The nerve was visually inspected and found to be decompressed nicely at  the conclusion of the case. The wound was thoroughly irrigated and  tourniquet was deflated. Bleeding was controlled with a bipolar device as needed. Skin edges were re-approximated with interrupted 4-0 nylon sutures. Sterile dressing was applied with Xeroform, 4 x 8's, sterile Webril, and  an Ace wrap. The patient was awakened and taken to PACU in stable condition. All sponge and needle counts were correct. POSTOPERATIVE PLAN:  The patient will be discharged to home with instructions  for light use of the hand with range of motion exercises. Follow up in the office in 10 to 14 days for suture removal and we will  proceed with strengthening activities at that time.     Electronically signed by Lisbeth Dubin, MD on 8/30/2021 at 8:05 AM

## 2021-08-30 NOTE — PROGRESS NOTES
0810 Pt. Returned from unit, bedside report received from [address] [place] [address]. Pt. A&O, denies pain / discomfort at this time. 2164 Pt. Able to wiggle fingers to R hand, cap refill good, skin warm to touch. Pt. Does not want anything to eat/ drink at this time. Daughter at bedside. Jeyson Pfeiffer 429 instructions reviewed with patient and daughter, expresses understanding. Incision site care reviewed with pt as well as extra supplies sent home with pt. 8830 Pt. To DC home in private vehicle with daughter.

## 2021-08-30 NOTE — H&P
Chief Complaint   Patient presents with    Wrist Pain       right wrist carpal tunnel          History of Present Illness: Augusto Anderson is a 78 y.o. female who presents today for evaluation of her right hand pain numbness and tingling. She has symptoms that radiate out into the radial 3 digits. She has constant numbness and tingling in the fingertips and discussed worse with repetitive activities. She also has pain wakes her up at night. She has a history of previous degenerative joint disease of the basal joint of her right thumb which has been treated in the past with braces and injections. This is a chronic issue which she has dealt with conservatively. Unfortunately her numbness and tingling and weakness issues have not significantly progressed over the last few months and she had an EMG nerve conduction velocity test ordered.     Patient presents to the office for a consultation to address her right wrist carpal tunnel and paresthesia of the right arm that began over a month ago. No previous injuries or surgeries, but patient has had steroid injections administered by Dr. Chloe Solis for her symptoms with the first injection providing significant relief and the second injection not as effective. Pain is described as a shooting pain that radiates from the thumb and index finger up the arm towards the shoulder with symptoms becoming aggravated by rotational movement, repetitive activities, or lifting. Symptoms have now begun to affect her  and grasp and she takes Ibuprofen for symptoms relief. Recent EMG completed on 7/29/21 revealed results as follows:        EMG  IMPRESSION:                  1.  Abnormal EMG. 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                  2.  No evidence of a focal cervical spinal nerve root injury (radiculopathy) despite a very convincing history.                 3.  No signs of a concurrent cervical plexopathy, generalized neuropathy or primary muscle disease.                   Clinically, patient has some arthralgias at the first [facility] joint and some features of some reactive myofascial pain through the shoulder girdle muscles, possibly in response to cervical spondylosis.       Medical History  Patient's medications, allergies, past medical, surgical, social and family histories were reviewed and updated as appropriate.     Past Medical History        Past Medical History:   Diagnosis Date    Cervical cancer ([facility] [address]      Dysuria      Edema      Female bladder prolapse      Hernia, hiatal      Insomnia      Lipoma of neck      Lumbago      Nodule of kidney      Osteoarthritis      Plantar fasciitis      PVD (peripheral vascular disease) ([facility] [address]      Sinusitis      Varicose veins of both lower extremities 2014     Dr Jose De Jesus Lopes         Past Surgical History         Past Surgical History:   Procedure Laterality Date    BLADDER REPAIR   2005    HERNIA REPAIR Bilateral       Inguinal hernia repairs    HIATAL HERNIA REPAIR   07/2020    HYSTERECTOMY, TOTAL ABDOMINAL   10/10/2015     cervix ca-adenoid basal epithelioma    KNEE ARTHROSCOPY Left 04/07/2014     Dr Germania Linn         Family History         Family History   Problem Relation Age of Onset    Diabetes Mother      Colon Cancer Mother      Heart Failure Father      Cancer Sister           tonsil, galbladder    Diabetes Sister      Heart Failure Sister      Hypertension Sister      Migraines Sister      Diabetes Brother      Heart Failure Brother      Cancer Other           several aunts and uncles   [facility] Breast Cancer Brother           Social History   Social History      Socioeconomic History    Marital status:         Spouse name: None    Number of children: None    Years of education: None    Highest education level: None   Occupational History    None   Tobacco Use    Smoking status: Never Smoker    Smokeless tobacco: Never Used   Vaping Use    Vaping Use: Never used   Substance and Sexual Activity    Alcohol use: No    Drug use: No    Sexual activity: Never   Other Topics Concern    None   Social History Narrative    None      Social Determinants of Health          Financial Resource Strain:     Difficulty of Paying Living Expenses:    Food Insecurity:     Worried About Running Out of Food in the Last Year:     Ran Out of Food in the Last Year:    Transportation Needs:     Lack of Transportation (Medical):  Lack of Transportation (Non-Medical):    Physical Activity:     Days of Exercise per Week:     Minutes of Exercise per Session:    Stress:     Feeling of Stress :    Social Connections:     Frequency of Communication with Friends and Family:     Frequency of Social Gatherings with Friends and Family:     Attends [employer] Services:     Active Member of Clubs or Organizations:     Attends Club or Organization Meetings:     Marital Status:    Intimate Partner Violence:     Fear of Current or Ex-Partner:     Emotionally Abused:     Physically Abused:     Sexually Abused:          Current Facility-Administered Medications          Current Outpatient Medications   Medication Sig Dispense Refill    zolpidem (AMBIEN) 10 MG tablet Take 0.5 tablets by mouth nightly as needed for Sleep for up to 90 days.  PLEASE DISPENSE ONLY 10 EVERY 30 DAYS DUE TO AGE AND FALLS RISK 10 tablet 2    zoster recombinant adjuvanted vaccine (SHINGRIX) 50 MCG/0.5ML SUSR injection Inject 0.5 mLs into the muscle See Admin Instructions 1 dose now and repeat in 2-6 months 0.5 mL 0    ELDERBERRY PO Take by mouth        latanoprost (XALATAN) 0.005 % ophthalmic solution          vitamin B-12 (CYANOCOBALAMIN) 100 MCG tablet Take 50 mcg by mouth daily        Ascorbic Acid (VITAMIN C) 500 MG tablet Take 500 mg by mouth daily        CINNAMON PO Take by mouth daily        Multiple Vitamin (MULTI VITAMIN DAILY PO) Take by mouth daily        Calcium Citrate-Vitamin D (CALCIUM + D PO) Take by mouth daily        betamethasone dipropionate (DIPROLENE) 0.05 % ointment Apply topically twice daily to rash on finger. (Patient not taking: Reported on 8/17/2021) 45 g 1    diclofenac sodium 1 % GEL Apply 2 g topically 4 times daily (Patient not taking: Reported on 8/17/2021) 2 Tube 1      No current facility-administered medications for this visit.               Allergies   Allergen Reactions    Prevnar 13 [Pneumococcal 13-Latisha Conj Vacc] Rash       Swelling of injection site and upper arm for more than 7 days. No infection.  Amoxicillin Rash    Flexeril [Cyclobenzaprine Hcl] Nausea And Vomiting            Review of Systems                                              Examination:  General Exam:  Vitals: Pulse 78   Resp 14   Ht 5' 4\" (1.626 m)   Wt 138 lb 8 oz (62.8 kg)   SpO2 98%   BMI 23.77 kg/m²    Physical Exam  Vitals and nursing note reviewed. Constitutional:       Appearance: Normal appearance. HENT:      Head: Normocephalic and atraumatic. Eyes:      Conjunctiva/sclera: Conjunctivae normal.      Pupils: Pupils are equal, round, and reactive to light. Pulmonary:      Effort: Pulmonary effort is normal.   Musculoskeletal:      Right elbow: No swelling or effusion. Normal range of motion. No tenderness. Right forearm: No swelling, edema, tenderness or bony tenderness. Left wrist: Normal.      Left hand: No swelling, deformity, tenderness or bony tenderness. Normal range of motion. Normal strength. Normal strength of finger abduction, thumb/finger opposition and wrist extension. Normal sensation. There is no disruption of two-point discrimination. Normal capillary refill. Cervical back: Normal range of motion. Comments: Right Upper Extremity:    There is mild tenderness to palpation of the volar aspect of the wrist at the level of the carpal tunnel.   There is decreased sensation to light touch in the median nerve distribution. Sensation is intact to light touch along the ulnar and radial nerves. Positive carpal compression test and Phalen's test.  There is mild relative weakness with 4+ strength out of 5 during  test, pinch test, and flexor pollicis brevis. Range of motion of the wrist and fingers is intact without limitation. Skin is intact without wounds or rash. 2+ radial pulse with brisk cap refill throughout the fingers. No atrophy of the thenar musculature. Strength 5/5 in finger and wrist flexion and extension. There is moderate tenderness to palpation at the thumb [facility] joint with a positive grind test and prominent spurring present. There is moderately restricted range of motion across the thumb [facility] joint. Pain referred to the thumb [facility] joint. Heavy pinch and  with thumb. Skin:     General: Skin is warm and dry. Neurological:      Mental Status: She is alert and oriented to person, place, and time. Psychiatric:         Mood and Affect: Mood normal.         Behavior: Behavior normal.               Diagnostic testing:  X-ray images were reviewed by myself and discussed with the patient:  3 views of the right wrist show evidence of advanced degenerative joint    disease at the thumb carpometacarpal joint.  There is advanced joint space    narrowing and extensive subchondral sclerosis with mild spurring present    both sides of the joint.  There is also evidence of mild diffuse    degenerative changes present throughout the carpal articulations including    cystic changes of the lunate.  There is diffuse decreased bone mineral    density.  There is normal alignment of the wrist and carpal articulations    with the exception of mild subluxation across the base of the first    metacarpal at the thumb [facility] joint.       No evidence of fracture or acute abnormality            Office Procedures:  No orders of the defined types were placed in this encounter.        Assessment and Plan  1.   Right carpal tunnel syndrome     2. Right thumb carpometacarpal joint primary osteoarthritis     The exam and history are consistent with carpal syndrome. We discussed treatment options for this. Conservative measures have not been successful, including activity modification, rest, over-the-counter anti-inflammatory medications, and night bracing. Due to the severity of symptoms I have recommended surgical intervention. This will consist of a carpal tunnel release.     We discussed the findings of the EMG nerve conduction velocity test.  I explained that I would expect many of her nerve symptoms to improve following carpal tunnel release. We discussed the potential for ongoing pain and stiffness and dysfunction at the basal joint of her thumb given the severity of her degenerative joint disease at the same area. She may benefit in the future from hand therapy, custom bracing, and injections to the thumb basal joint even after successful improvement following carpal tunnel release.     We discussed the risks, benefits, and alternatives to surgery as well as the postoperative course. Risks include persistent pain, numbness, weakness, infection, stiffness, recurrence, and incomplete recovery of the nerve. The patient understands and would like to proceed with carpal tunnel release.     Plan will be to proceed with carpal tunnel release under local anesthesia    History and Physical exam note from the office visit is copied above from epic. I have reviewed the note and examined the patient and find no relevant changes.      I have reviewed with the patient and/or family the risks, benefits, and alternatives to the procedure as well as expected postoperative course    Russell Richardson MD

## 2021-09-08 ENCOUNTER — NURSE TRIAGE (OUTPATIENT)
Dept: OTHER | Facility: CLINIC | Age: 81
End: 2021-09-08

## 2021-09-08 NOTE — TELEPHONE ENCOUNTER
Received call from Hallie Hinton at [facility] with [employer]. Brief description of triage: episodes of altered mental status    Triage indicates for patient to see in office today. Pt informed if unable to get an appointment to go to urgent care or Emergency Department. Pt agreeable with plan. Care advice provided, patient verbalizes understanding; denies any other questions or concerns; instructed to call back for any new or worsening symptoms. Writer provided warm transfer to Fabiola Mujica at [facility] for appointment scheduling. Attention Provider: Thank you for allowing me to participate in the care of your patient. The patient was connected to triage in response to information provided to the [facility]. Please do not respond through this encounter as the response is not directed to a shared pool. Reason for Disposition   Patient wants to be seen    Answer Assessment - Initial Assessment Questions  1. SYMPTOM: \"What is the main symptom you are concerned about? \" (e.g., weakness, numbness)      Yesterday states something came over her. \"everything was fuzzy\" this episode was not dizziness. States felt paralyzed. Lasted <1 minute. Did not fall. No nausea. States like time froze. 2. ONSET: \"When did this start? \" (minutes, hours, days; while sleeping)      Past 6 months episodes last a few seconds each episode. States out of the blue. States felt wear and near syncopal. States the episode yesterday was \"more emphasized\" and was different. States episodes are random. Approx total of 3 episodes prior to yesterday. 3. LAST NORMAL: \"When was the last time you were normal (no symptoms)? \"      Yesterday prior to episode    4. PATTERN \"Does this come and go, or has it been constant since it started? \"  \"Is it present now? \"      Not present now    5. CARDIAC SYMPTOMS: \"Have you had any of the following symptoms: chest pain, difficulty breathing, palpitations? \"      No chest pain, breathing complication during episodes    6. NEUROLOGIC SYMPTOMS: \"Have you had any of the following symptoms: headache, dizziness, vision loss, double vision, changes in speech, unsteady on your feet? \"      States has hit her head multiple times, once on a fan, once on a cabinet, during a fall. 7. OTHER SYMPTOMS: \"Do you have any other symptoms? \"      Denies dizziness or nausea. States having anxiety since she babysits her grand-daughter. Sometimes has pain at base of neck. Had recent x-rays of spine. 8. PREGNANCY: \"Is there any chance you are pregnant? \" \"When was your last menstrual period? \"      postmenopausal    Protocols used: NEUROLOGIC DEFICIT-ADULT-[place]

## 2021-09-09 ENCOUNTER — OFFICE VISIT (OUTPATIENT)
Dept: ORTHOPEDIC SURGERY | Age: 81
End: 2021-09-09

## 2021-09-09 VITALS
HEIGHT: 64 IN | OXYGEN SATURATION: 97 % | HEART RATE: 76 BPM | WEIGHT: 138 LBS | RESPIRATION RATE: 16 BRPM | BODY MASS INDEX: 23.56 KG/M2

## 2021-09-09 DIAGNOSIS — G56.01 CARPAL TUNNEL SYNDROME ON RIGHT: Primary | ICD-10-CM

## 2021-09-09 PROCEDURE — 99024 POSTOP FOLLOW-UP VISIT: CPT | Performed by: ORTHOPAEDIC SURGERY

## 2021-09-09 NOTE — PROGRESS NOTES
9/9/2021   Chief Complaint   Patient presents with    Post-Op Check     right CTR DOS 8/30/21        History of Present Illness: Bob Samson is a 78 y.o. female who returns today for follow-up of her right carpal tunnel release. She is doing well and has had some improvement in her pain and numbness along the hand in the median nerve distribution    Patient returns to the office for a 10 day post operative follow up on her right carpal tunnel release that was performed on 8/30/21. She continues to have the shooting pain radiating up the arm with the patient now having swelling within the bicep. Discomfort is described as an aching sensation within the wrist, but overall, patient is progressing well with patient denying SOB, chest or calf muscle pain. Sutures remain clean, dry, and intact without signs of infection or drainage. Medical History  Patient's medications, allergies, past medical, surgical, social and family histories were reviewed and updated as appropriate. Review of Systems                                            Examination:  General Exam:  Vitals: Pulse 76   Resp 16   Ht 5' 4\" (1.626 m)   Wt 138 lb (62.6 kg)   SpO2 97%   BMI 23.69 kg/m²    Physical Exam   Surgical incision well-healed. No erythema, drainage, or induration. Sutures were removed without difficulty. Mild swelling at the surgical site. Mild tenderness adjacent to the surgical scar. Improved sensation in the median nerve distribution of the hand and fingers. No restricted range of motion of the wrist, thumb, or fingers. Good capillary refill        Assessment and Plan  1. Right carpal tunnel release    The patient is doing very well following surgical release of the carpal tunnel. The incision is well-healed. I have recommended that the patient advance activities as tolerated at this point. We discussed exercises for strengthening and stretching activities.   We discussed the likelihood of discomfort and pillar pain adjacent to the healing surgical scar. I have explained that nerve function, strength, and sensation may continue to improve for several more weeks.       The patient will follow-up here in 3 to 4 weeks for a check of their recovery          Electronically signed by Lisbeth Dubin, MD on 9/9/2021 at 11:51 AM

## 2021-09-09 NOTE — PROGRESS NOTES
Patient returns to the office for a 10 day post operative follow up on her right carpal tunnel release that was performed on 8/30/21. She continues to have the shooting pain radiating up the arm with the patient now having swelling within the bicep. Discomfort is described as an aching sensation within the wrist, but overall, patient is progressing well with patient denying SOB, chest or calf muscle pain. Sutures remain clean, dry, and intact without signs of infection or drainage.

## 2021-09-09 NOTE — PATIENT INSTRUCTIONS
Sutures removed  Continue to work on stretching and ROM exercises  May take OTC pain relievers as needed  Rest, ice, and elevate as needed  Avoid heavy lifting, pushing, or pulling with the right extremity for 2 weeks  Massage the incision site to prevent scar tissue buildup  May allow water to run over the hand, but do not submerge the hand for prolonged periods of time in water  Follow up in 4 weeks

## 2021-09-10 ENCOUNTER — OFFICE VISIT (OUTPATIENT)
Dept: FAMILY MEDICINE CLINIC | Age: 81
End: 2021-09-10
Payer: MEDICARE

## 2021-09-10 VITALS
HEART RATE: 68 BPM | BODY MASS INDEX: 24.37 KG/M2 | WEIGHT: 142 LBS | DIASTOLIC BLOOD PRESSURE: 86 MMHG | SYSTOLIC BLOOD PRESSURE: 122 MMHG | OXYGEN SATURATION: 96 %

## 2021-09-10 DIAGNOSIS — M54.12 CERVICAL RADICULOPATHY: ICD-10-CM

## 2021-09-10 DIAGNOSIS — Z86.2 HISTORY OF ANEMIA: ICD-10-CM

## 2021-09-10 DIAGNOSIS — Z12.31 ENCOUNTER FOR SCREENING MAMMOGRAM FOR MALIGNANT NEOPLASM OF BREAST: ICD-10-CM

## 2021-09-10 DIAGNOSIS — E78.2 MIXED HYPERLIPIDEMIA: ICD-10-CM

## 2021-09-10 DIAGNOSIS — R53.83 FATIGUE, UNSPECIFIED TYPE: ICD-10-CM

## 2021-09-10 DIAGNOSIS — R55 NEAR SYNCOPE: Primary | ICD-10-CM

## 2021-09-10 LAB
A/G RATIO: 1.8 (ref 1.1–2.2)
ALBUMIN SERPL-MCNC: 4.1 G/DL (ref 3.4–5)
ALP BLD-CCNC: 78 U/L (ref 40–129)
ALT SERPL-CCNC: 13 U/L (ref 10–40)
ANION GAP SERPL CALCULATED.3IONS-SCNC: 14 MMOL/L (ref 3–16)
AST SERPL-CCNC: 16 U/L (ref 15–37)
BILIRUB SERPL-MCNC: 0.5 MG/DL (ref 0–1)
BUN BLDV-MCNC: 15 MG/DL (ref 7–20)
CALCIUM SERPL-MCNC: 10.1 MG/DL (ref 8.3–10.6)
CHLORIDE BLD-SCNC: 103 MMOL/L (ref 99–110)
CHOLESTEROL, TOTAL: 215 MG/DL (ref 0–199)
CO2: 23 MMOL/L (ref 21–32)
CREAT SERPL-MCNC: 0.6 MG/DL (ref 0.6–1.2)
FOLATE: 17.87 NG/ML (ref 4.78–24.2)
GFR AFRICAN AMERICAN: >60
GFR NON-AFRICAN AMERICAN: >60
GLOBULIN: 2.3 G/DL
GLUCOSE BLD-MCNC: 93 MG/DL (ref 70–99)
HCT VFR BLD CALC: 42 % (ref 36–48)
HDLC SERPL-MCNC: 56 MG/DL (ref 40–60)
HEMOGLOBIN: 13.8 G/DL (ref 12–16)
LDL CHOLESTEROL CALCULATED: 137 MG/DL
MCH RBC QN AUTO: 26.9 PG (ref 26–34)
MCHC RBC AUTO-ENTMCNC: 32.8 G/DL (ref 31–36)
MCV RBC AUTO: 82.1 FL (ref 80–100)
PDW BLD-RTO: 14 % (ref 12.4–15.4)
PLATELET # BLD: 182 K/UL (ref 135–450)
PMV BLD AUTO: 8.3 FL (ref 5–10.5)
POTASSIUM SERPL-SCNC: 4.3 MMOL/L (ref 3.5–5.1)
RBC # BLD: 5.12 M/UL (ref 4–5.2)
SODIUM BLD-SCNC: 140 MMOL/L (ref 136–145)
TOTAL PROTEIN: 6.4 G/DL (ref 6.4–8.2)
TRIGL SERPL-MCNC: 112 MG/DL (ref 0–150)
TSH SERPL DL<=0.05 MIU/L-ACNC: 1.27 UIU/ML (ref 0.27–4.2)
VITAMIN B-12: 358 PG/ML (ref 211–911)
VLDLC SERPL CALC-MCNC: 22 MG/DL
WBC # BLD: 4.8 K/UL (ref 4–11)

## 2021-09-10 PROCEDURE — 4040F PNEUMOC VAC/ADMIN/RCVD: CPT | Performed by: FAMILY MEDICINE

## 2021-09-10 PROCEDURE — 99214 OFFICE O/P EST MOD 30 MIN: CPT | Performed by: FAMILY MEDICINE

## 2021-09-10 PROCEDURE — 36415 COLL VENOUS BLD VENIPUNCTURE: CPT | Performed by: FAMILY MEDICINE

## 2021-09-10 PROCEDURE — G8428 CUR MEDS NOT DOCUMENT: HCPCS | Performed by: FAMILY MEDICINE

## 2021-09-10 PROCEDURE — 1036F TOBACCO NON-USER: CPT | Performed by: FAMILY MEDICINE

## 2021-09-10 PROCEDURE — G8399 PT W/DXA RESULTS DOCUMENT: HCPCS | Performed by: FAMILY MEDICINE

## 2021-09-10 PROCEDURE — G8420 CALC BMI NORM PARAMETERS: HCPCS | Performed by: FAMILY MEDICINE

## 2021-09-10 PROCEDURE — 1123F ACP DISCUSS/DSCN MKR DOCD: CPT | Performed by: FAMILY MEDICINE

## 2021-09-10 PROCEDURE — 1090F PRES/ABSN URINE INCON ASSESS: CPT | Performed by: FAMILY MEDICINE

## 2021-09-10 NOTE — PROGRESS NOTES
Chief Complaint   Patient presents with    Other     pt reports an episode where she was unable to move and was unable to focus objects in the room around her where distorted and fuzzy, pt also repoerts weightless feeling as well lasting about a minute . pt states she has a hx of incidents where she has hit her head and is concerned this could be connected.  Pain     back of neck and right arm pt reports severe pain in right arm occuring sunday night, pt took some aspirin and pain subsided        [place] NARRATIVE:    Felt like she could pass out going down the aisle in a store but one epsiodes recently more severe, without dizziness. She felt \"fuzzy\" while at a store. This last episode was  Very scary, she stood there and lasted about 1 minute. No premonitory sx nor any headache, not from fatigue and not related to lack of food. Having sparkling things in her vision. No headache. Also having posterior neck pain and right arm radiating pain. Has intermittent neck pain but not sure there was neck pain with this incident. Taking f/v supplement capsules. Patient is established unless otherwise noted. Above chief complaint and [place] obtained by this physician provider. Review of Systems   Constitutional: Negative for activity change, chills, fatigue and fever. HENT: Negative for congestion. Respiratory: Negative for cough, chest tightness, shortness of breath and wheezing. Cardiovascular: Negative for chest pain and leg swelling. Gastrointestinal: Negative for abdominal pain. Musculoskeletal: Negative for back pain and myalgias. Skin: Negative for rash. Neurological: Positive for light-headedness (presyncope episodes). Reporting visual scotoma without headache   Psychiatric/Behavioral: Negative for behavioral problems and dysphoric mood.        Allergies   Allergen Reactions    Prevnar 13 [Pneumococcal 13-Latisha Conj Vacc] Rash     Swelling of injection site and upper arm for more than 7 days. No infection.  Amoxicillin Rash    Flexeril [Cyclobenzaprine Hcl] Nausea And Vomiting     Allergy historyupdated. Outpatient Medications Marked as Taking for the 9/10/21 encounter (Office Visit) with Karyna Vela MD   Medication Sig Dispense Refill    zinc sulfate (ZINCATE) 220 (50 Zn) MG capsule Take 50 mg by mouth daily      zolpidem (AMBIEN) 10 MG tablet Take 0.5 tablets by mouth nightly as needed for Sleep for up to 90 days. PLEASE DISPENSE ONLY 10 EVERY 30 DAYS DUE TO AGE AND FALLS RISK 10 tablet 2    ELDERBERRY PO Take by mouth      latanoprost (XALATAN) 0.005 % ophthalmic solution       vitamin B-12 (CYANOCOBALAMIN) 100 MCG tablet Take 50 mcg by mouth daily      Ascorbic Acid (VITAMIN C) 500 MG tablet Take 500 mg by mouth daily      CINNAMON PO Take by mouth daily      Multiple Vitamin (MULTI VITAMIN DAILY PO) Take by mouth daily      Calcium Citrate-Vitamin D (CALCIUM + D PO) Take by mouth daily         Tobacco use history updated. Social History     Tobacco Use   Smoking Status Never Smoker   Smokeless Tobacco Never Used        Nursing note reviewed. Vitals:    09/10/21 1124   BP: 122/86   Site: Left Upper Arm   Position: Sitting   Cuff Size: Medium Adult   Pulse: 68   SpO2: 96%   Weight: 142 lb (64.4 kg)          BP Readings from Last 3 Encounters:   09/10/21 122/86   08/30/21 (!) 162/82   06/25/21 122/84     Wt Readings from Last 3 Encounters:   09/10/21 142 lb (64.4 kg)   09/09/21 138 lb (62.6 kg)   08/30/21 140 lb (63.5 kg)     Body mass index is 24.37 kg/m². No results found for this visit on 09/10/21. Physical Exam  Vitals and nursing note reviewed. Constitutional:       General: She is not in acute distress. Appearance: She is well-developed. She is not diaphoretic. HENT:      Head: Normocephalic. Eyes:      General:         Right eye: No discharge. Left eye: No discharge.       Conjunctiva/sclera: Conjunctivae normal.      Pupils: Pupils are equal, round, and reactive to light. Cardiovascular:      Rate and Rhythm: Normal rate and regular rhythm. Heart sounds: Normal heart sounds. No murmur heard. Pulmonary:      Effort: Pulmonary effort is normal. No respiratory distress. Breath sounds: Normal breath sounds. No wheezing or rales. Musculoskeletal:      Cervical back: Normal range of motion. Skin:     General: Skin is warm and dry. Neurological:      Mental Status: She is alert and oriented to person, place, and time. Psychiatric:         Behavior: Behavior normal.           _________________________________________________  Assessment:     1. Near syncope  -     CBC  -     COMPREHENSIVE METABOLIC PANEL  -     TSH without Reflex  -     VITAMIN B12 & FOLATE  2. Encounter for screening mammogram for malignant neoplasm of breast  -     LUTHER DIGITAL SCREEN W OR WO CAD BILATERAL; Future  3. Fatigue, unspecified type  -     CBC  -     TSH without Reflex  -     VITAMIN B12 & FOLATE  4. History of anemia  -     CBC  5. Mixed hyperlipidemia  -     Lipid Panel  6. Cervical radiculopathy    Check labs as above. She should rest and avoid going out if having any symptoms. If sx recure we will consider neuro referral, if persistent episode or true syncope or duration longer than a minute or two she should call EMS or go to ER (but not drive). Problems listed above are stable and therapeutic plan is unchanged unless otherwise specified. See orders above and comments below for details of workup or medication orders. _________________________________________________  Plan:     Return if symptoms worsen or fail to improve, for recheck on near syncope and arm pain.       Electronically signed by Isiah Allison MD on 9/10/21 at 11:27 AM EDT

## 2021-09-12 NOTE — RESULT ENCOUNTER NOTE
Result note show a normal comprehensive metabolic panel. Cholesterol profile has modest elevation in total cholesterol and LDL. No treatment is indicated for that. Vitamin B12 and folate, CBC, TSH are all normal.    Not routed for staff call but released to my chart only.

## 2021-09-25 ASSESSMENT — ENCOUNTER SYMPTOMS
ABDOMINAL PAIN: 0
WHEEZING: 0
COUGH: 0
SHORTNESS OF BREATH: 0
BACK PAIN: 0
CHEST TIGHTNESS: 0

## 2021-10-07 ENCOUNTER — OFFICE VISIT (OUTPATIENT)
Dept: ORTHOPEDIC SURGERY | Age: 81
End: 2021-10-07

## 2021-10-07 VITALS
OXYGEN SATURATION: 96 % | BODY MASS INDEX: 23.56 KG/M2 | RESPIRATION RATE: 16 BRPM | HEIGHT: 64 IN | WEIGHT: 138 LBS | HEART RATE: 74 BPM

## 2021-10-07 DIAGNOSIS — Z09 POSTOP CHECK: ICD-10-CM

## 2021-10-07 DIAGNOSIS — G56.01 CARPAL TUNNEL SYNDROME ON RIGHT: Primary | ICD-10-CM

## 2021-10-07 DIAGNOSIS — M18.11 LOCALIZED PRIMARY OSTEOARTHRITIS OF CARPOMETACARPAL JOINT OF RIGHT THUMB: ICD-10-CM

## 2021-10-07 PROCEDURE — 99024 POSTOP FOLLOW-UP VISIT: CPT | Performed by: ORTHOPAEDIC SURGERY

## 2021-10-07 NOTE — PROGRESS NOTES
Patient returns to the office for a 6 week post operative follow up on her right carpal tunnel release. DOS 8/30/21. Patient rates her continued pain at a 2/10 today and describes her pain as a \"sunburn\" type sensation along the base of the palm with occasional sharp pain within the incision site. Patient has full ROM and sensation within the extremity, however, she continues to struggle with tearing open packaging. Patient is concerned that she may have damaged the extremity when she was  moving her mattress box springs from room to room about 3 weeks ago despite her wearing brace and being cautious. No new injury or other reported symptoms at this time.

## 2021-10-07 NOTE — PATIENT INSTRUCTIONS
Weightbearing and activities as tolerated  May take Tylenol or Ibuprofen as needed  Rest, ice, and elevate as needed  Continue to work on ROM and strengthening exercises as discussed  Follow up as needed

## 2021-10-07 NOTE — PROGRESS NOTES
10/7/2021   Chief Complaint   Patient presents with    Post-Op Check     right CTR DOS 8/30/21        History of Present Illness: Erna Warren is a 78 y.o. female who returns today for follow-up of her carpal tunnel release and also discussion of her ongoing right thumb carpometacarpal joint degenerative disease. She has noticed improvement in the sensation in her fingertips. She feels that her hand is working better but she still does have aching pain in the basal joint of her thumb worse with repetitive pinching or twisting activities    Patient returns to the office for a 6 week post operative follow up on her right carpal tunnel release. DOS 8/30/21. Patient rates her continued pain at a 2/10 today and describes her pain as a \"sunburn\" type sensation along the base of the palm with occasional sharp pain within the incision site. Patient has full ROM and sensation within the extremity, however, she continues to struggle with tearing open packaging. Patient is concerned that she may have damaged the extremity when she moving her mattress box springs from room to room about 3 weeks ago despite her wearing brace and be cautious. No new injury or other reported symptoms at this time. Medical History  Patient's medications, allergies, past medical, surgical, social and family histories were reviewed and updated as appropriate. Review of Systems                                            Examination:  General Exam:  Vitals: Pulse 74   Resp 16   Ht 5' 4\" (1.626 m)   Wt 138 lb (62.6 kg)   SpO2 96%   BMI 23.69 kg/m²    Physical Exam     Right upper extremity:  Well-healed surgical scar over the carpal tunnel. No tenderness to palpation of the carpal tunnel. Mild tenderness to palpation and swelling and spur formation at the thumb carpometacarpal joint. Mild restricted range of motion during active motion across the thumb with pain referred to the basal joint.   There is pain with pinch and  referred to the basal joint of the thumb. Office Procedures:  No orders of the defined types were placed in this encounter. Assessment and Plan  1. Right carpal tunnel release    2. Right thumb carpometacarpal joint primary osteoarthritis    She has responded well and healed following her carpal tunnel release. She will continue to advance her activities as tolerated. We discussed the degenerative joint disease at the basal joint of her thumb. She is currently functioning reasonably well but having ongoing symptoms from her known degenerative condition. We discussed the potential use of steroid injections as needed in the future. She will follow-up if she would like to have a steroid injection for flareups of inflammation pain at the basal joint of her thumb.             Electronically signed by Will Curtis MD on 10/7/2021 at 4:48 PM

## 2021-11-01 DIAGNOSIS — G47.00 INSOMNIA, UNSPECIFIED TYPE: ICD-10-CM

## 2021-11-01 RX ORDER — ZOLPIDEM TARTRATE 10 MG/1
5 TABLET ORAL NIGHTLY PRN
Qty: 10 TABLET | Refills: 2 | Status: SHIPPED | OUTPATIENT
Start: 2021-11-01 | End: 2021-11-03 | Stop reason: SDUPTHER

## 2021-11-03 DIAGNOSIS — G47.00 INSOMNIA, UNSPECIFIED TYPE: ICD-10-CM

## 2021-11-03 RX ORDER — ZOLPIDEM TARTRATE 10 MG/1
5 TABLET ORAL NIGHTLY PRN
Qty: 10 TABLET | Refills: 2 | Status: SHIPPED | OUTPATIENT
Start: 2021-11-03 | End: 2021-11-30 | Stop reason: SDUPTHER

## 2021-11-30 ENCOUNTER — HOSPITAL ENCOUNTER (OUTPATIENT)
Dept: GENERAL RADIOLOGY | Age: 81
Discharge: HOME | End: 2021-11-30
Payer: MEDICARE

## 2021-11-30 ENCOUNTER — OFFICE VISIT (OUTPATIENT)
Dept: FAMILY MEDICINE CLINIC | Age: 81
End: 2021-11-30
Payer: MEDICARE

## 2021-11-30 ENCOUNTER — HOSPITAL ENCOUNTER (OUTPATIENT)
Age: 81
Discharge: HOME | End: 2021-11-30
Payer: MEDICARE

## 2021-11-30 ENCOUNTER — TELEPHONE (OUTPATIENT)
Dept: FAMILY MEDICINE CLINIC | Age: 81
End: 2021-11-30

## 2021-11-30 VITALS
SYSTOLIC BLOOD PRESSURE: 112 MMHG | DIASTOLIC BLOOD PRESSURE: 70 MMHG | HEART RATE: 66 BPM | OXYGEN SATURATION: 97 % | WEIGHT: 141 LBS | BODY MASS INDEX: 24.2 KG/M2

## 2021-11-30 DIAGNOSIS — R22.31 LOCALIZED SWELLING OF RIGHT UPPER EXTREMITY: ICD-10-CM

## 2021-11-30 DIAGNOSIS — G47.00 INSOMNIA, UNSPECIFIED TYPE: ICD-10-CM

## 2021-11-30 DIAGNOSIS — M25.511 ACUTE PAIN OF RIGHT SHOULDER: ICD-10-CM

## 2021-11-30 DIAGNOSIS — R29.898 RIGHT ARM WEAKNESS: ICD-10-CM

## 2021-11-30 DIAGNOSIS — M54.2 NECK PAIN: ICD-10-CM

## 2021-11-30 DIAGNOSIS — M25.511 ACUTE PAIN OF RIGHT SHOULDER: Primary | ICD-10-CM

## 2021-11-30 DIAGNOSIS — M79.601 RIGHT ARM PAIN: ICD-10-CM

## 2021-11-30 DIAGNOSIS — R82.90 URINE ABNORMALITY: ICD-10-CM

## 2021-11-30 DIAGNOSIS — M54.2 NECK PAIN: Primary | ICD-10-CM

## 2021-11-30 LAB
BILIRUBIN, POC: NEGATIVE
BLOOD URINE, POC: NEGATIVE
CLARITY, POC: CLEAR
COLOR, POC: YELLOW
GLUCOSE URINE, POC: NEGATIVE
KETONES, POC: NEGATIVE
LEUKOCYTE EST, POC: ABNORMAL
NITRITE, POC: NEGATIVE
PH, POC: 5.5
PROTEIN, POC: NEGATIVE
SPECIFIC GRAVITY, POC: 1.02
UROBILINOGEN, POC: ABNORMAL

## 2021-11-30 PROCEDURE — 1090F PRES/ABSN URINE INCON ASSESS: CPT | Performed by: FAMILY MEDICINE

## 2021-11-30 PROCEDURE — 72040 X-RAY EXAM NECK SPINE 2-3 VW: CPT

## 2021-11-30 PROCEDURE — G8399 PT W/DXA RESULTS DOCUMENT: HCPCS | Performed by: FAMILY MEDICINE

## 2021-11-30 PROCEDURE — G8427 DOCREV CUR MEDS BY ELIG CLIN: HCPCS | Performed by: FAMILY MEDICINE

## 2021-11-30 PROCEDURE — G8420 CALC BMI NORM PARAMETERS: HCPCS | Performed by: FAMILY MEDICINE

## 2021-11-30 PROCEDURE — G8484 FLU IMMUNIZE NO ADMIN: HCPCS | Performed by: FAMILY MEDICINE

## 2021-11-30 PROCEDURE — 99214 OFFICE O/P EST MOD 30 MIN: CPT | Performed by: FAMILY MEDICINE

## 2021-11-30 PROCEDURE — 81002 URINALYSIS NONAUTO W/O SCOPE: CPT | Performed by: FAMILY MEDICINE

## 2021-11-30 PROCEDURE — 1123F ACP DISCUSS/DSCN MKR DOCD: CPT | Performed by: FAMILY MEDICINE

## 2021-11-30 PROCEDURE — 1036F TOBACCO NON-USER: CPT | Performed by: FAMILY MEDICINE

## 2021-11-30 PROCEDURE — 71046 X-RAY EXAM CHEST 2 VIEWS: CPT

## 2021-11-30 PROCEDURE — 73030 X-RAY EXAM OF SHOULDER: CPT

## 2021-11-30 PROCEDURE — 4040F PNEUMOC VAC/ADMIN/RCVD: CPT | Performed by: FAMILY MEDICINE

## 2021-11-30 RX ORDER — ZOLPIDEM TARTRATE 10 MG/1
5 TABLET ORAL NIGHTLY PRN
Qty: 10 TABLET | Refills: 2 | Status: SHIPPED | OUTPATIENT
Start: 2021-11-30 | End: 2021-12-31 | Stop reason: SDUPTHER

## 2021-11-30 ASSESSMENT — ENCOUNTER SYMPTOMS
CHEST TIGHTNESS: 0
ABDOMINAL PAIN: 0
WHEEZING: 0
BACK PAIN: 0
SHORTNESS OF BREATH: 0
COUGH: 0

## 2021-11-30 NOTE — RESULT ENCOUNTER NOTE
Shoulder x-ray shows mild acromioclavicular change but no ball-and-socket or glenohumeral change. No findings here so I think Renaf to look to the cervical spine.

## 2021-11-30 NOTE — PROGRESS NOTES
DUE TO AGE AND FALLS RISK 10 tablet 2    zinc sulfate (ZINCATE) 220 (50 Zn) MG capsule Take 50 mg by mouth daily      ELDERBERRY PO Take by mouth      latanoprost (XALATAN) 0.005 % ophthalmic solution       vitamin B-12 (CYANOCOBALAMIN) 100 MCG tablet Take 50 mcg by mouth daily      Ascorbic Acid (VITAMIN C) 500 MG tablet Take 500 mg by mouth daily      CINNAMON PO Take by mouth daily      Multiple Vitamin (MULTI VITAMIN DAILY PO) Take by mouth daily      Calcium Citrate-Vitamin D (CALCIUM + D PO) Take by mouth daily         Tobacco use history updated. Social History     Tobacco Use   Smoking Status Never Smoker   Smokeless Tobacco Never Used        Nursing note reviewed. Vitals:    11/30/21 1057   BP: 112/70   Site: Left Upper Arm   Position: Sitting   Cuff Size: Medium Adult   Pulse: 66   SpO2: 97%   Weight: 141 lb (64 kg)          BP Readings from Last 3 Encounters:   11/30/21 112/70   09/10/21 122/86   08/30/21 (!) 162/82     Wt Readings from Last 3 Encounters:   11/30/21 141 lb (64 kg)   10/07/21 138 lb (62.6 kg)   09/10/21 142 lb (64.4 kg)     Body mass index is 24.2 kg/m². No results found for this visit on 11/30/21. Physical Exam  Vitals and nursing note reviewed. Constitutional:       General: She is not in acute distress. Appearance: She is well-developed. She is not diaphoretic. HENT:      Head: Normocephalic. Eyes:      General:         Right eye: No discharge. Left eye: No discharge. Conjunctiva/sclera: Conjunctivae normal.      Pupils: Pupils are equal, round, and reactive to light. Cardiovascular:      Rate and Rhythm: Normal rate and regular rhythm. Heart sounds: Normal heart sounds. No murmur heard. Pulmonary:      Effort: Pulmonary effort is normal. No respiratory distress. Breath sounds: Normal breath sounds. No wheezing or rales. Musculoskeletal:         General: No swelling, tenderness or signs of injury. Normal range of motion. Cervical back: Normal range of motion. Comments: Shoulder range of motion is actually completely full with internal and external rotation of flexion-extension. Her area of pain is in the trapezius and paraspinal musculature. She has some supraspinatus tenderness as well. She has no pain to palpation of the lower arm. Skin:     General: Skin is warm and dry. Neurological:      Mental Status: She is alert and oriented to person, place, and time. Psychiatric:         Behavior: Behavior normal.       Lab Results   Component Value Date    COLORU YELLOW 11/30/2021    COLORU YELLOW 12/06/2017    NITRU POSITIVE 12/06/2017    GLUCOSEU NEGATIVE 11/30/2021    GLUCOSEU Negative 12/06/2017    KETUA NEGATIVE 11/30/2021    KETUA Negative 12/06/2017    UROBILINOGEN 0.2 12/06/2017    BILIRUBINUR NEGATIVE 11/30/2021         _________________________________________________  Assessment:     1. Acute pain of right shoulder  -     XR SHOULDER RIGHT (MIN 2 VIEWS); Future  -     XR CERVICAL SPINE (2-3 VIEWS); Future  -     XR CHEST STANDARD (2 VW); Future  2. Urine abnormality  -     POCT Urinalysis no Micro  3. Neck pain  -     XR SHOULDER RIGHT (MIN 2 VIEWS); Future  -     XR CERVICAL SPINE (2-3 VIEWS); Future  -     XR CHEST STANDARD (2 VW); Future  4. Localized swelling of right upper extremity  -     XR CHEST STANDARD (2 VW); Future  5. Right arm weakness    Strongly suspect that her shoulder pain is initiating from cervical spine arthritis and possibly a compressed nerve either by arthritis or disc disease. We will start with plain films of the area. We will also do a chest x-ray. She may ultimately need MRI and/or EMG to define what is going on. Urinalysis was positive for nitrates only. No medications were prescribed but will increase her fluid intake. She continues to decline vaccinations. Problems listed above are stable and therapeutic plan is unchanged unless otherwise specified.       See orders above and comments below for details of workup or medication orders. _________________________________________________  Plan:     May need MRI or EMG depending on plain films results. Return in about 5 weeks (around 1/4/2022), or if symptoms worsen or fail to improve, for review xrays.       Electronically signed by Gopal Salas MD on 11/30/21 at 11:14 AM EST

## 2021-11-30 NOTE — RESULT ENCOUNTER NOTE
Chest x-ray result was negative or normal.  Degenerative changes are seen in the thoracic and cervical spine.

## 2021-11-30 NOTE — RESULT ENCOUNTER NOTE
Cervical spine shows significant degenerative changes, similar to previous readings this year. We would suggest moving onto MRI for evaluation.   I will place this order and they will call you to schedule this test.

## 2021-12-01 LAB — URINE CULTURE, ROUTINE: NORMAL

## 2021-12-30 DIAGNOSIS — G47.00 INSOMNIA, UNSPECIFIED TYPE: ICD-10-CM

## 2021-12-31 RX ORDER — ZOLPIDEM TARTRATE 10 MG/1
5 TABLET ORAL NIGHTLY PRN
Qty: 10 TABLET | Refills: 2 | Status: SHIPPED | OUTPATIENT
Start: 2021-12-31 | End: 2022-03-31

## 2022-01-11 ENCOUNTER — HOSPITAL ENCOUNTER (OUTPATIENT)
Dept: MRI IMAGING | Age: 82
Discharge: HOME | End: 2022-01-11
Payer: MEDICARE

## 2022-01-11 ENCOUNTER — HOSPITAL ENCOUNTER (OUTPATIENT)
Dept: WOMENS IMAGING | Age: 82
Discharge: HOME | End: 2022-01-11
Payer: MEDICARE

## 2022-01-11 DIAGNOSIS — M79.601 RIGHT ARM PAIN: ICD-10-CM

## 2022-01-11 DIAGNOSIS — Z12.31 ENCOUNTER FOR SCREENING MAMMOGRAM FOR MALIGNANT NEOPLASM OF BREAST: ICD-10-CM

## 2022-01-11 DIAGNOSIS — M54.2 NECK PAIN: ICD-10-CM

## 2022-01-11 PROCEDURE — 72141 MRI NECK SPINE W/O DYE: CPT

## 2022-01-11 PROCEDURE — 77063 BREAST TOMOSYNTHESIS BI: CPT

## 2022-01-14 NOTE — RESULT ENCOUNTER NOTE
MRI of the cervical spine shows disc bulging, degeneration, osteophytes overgrowing with possible nerve compression and narrowing of the central canal.  She can come in to discuss if she wants but we are concerned that her shoulder and arm pain is related to cervical spine arthritis which is supported by this MRI result. There is a chance she might need pain management injections or possibly neurosurgery. Those options are out there and can be discussed at a follow-up.

## 2022-04-11 ENCOUNTER — OFFICE VISIT (OUTPATIENT)
Dept: FAMILY MEDICINE CLINIC | Age: 82
End: 2022-04-11
Payer: MEDICARE

## 2022-04-11 VITALS
BODY MASS INDEX: 23.73 KG/M2 | SYSTOLIC BLOOD PRESSURE: 136 MMHG | OXYGEN SATURATION: 94 % | HEIGHT: 64 IN | DIASTOLIC BLOOD PRESSURE: 82 MMHG | HEART RATE: 68 BPM | WEIGHT: 139 LBS

## 2022-04-11 DIAGNOSIS — Z28.21 COVID-19 VACCINATION DECLINED: ICD-10-CM

## 2022-04-11 DIAGNOSIS — Z23 NEED FOR CHICKENPOX VACCINATION: ICD-10-CM

## 2022-04-11 DIAGNOSIS — F51.02 ADJUSTMENT INSOMNIA: ICD-10-CM

## 2022-04-11 DIAGNOSIS — I35.1 MODERATE AORTIC REGURGITATION: ICD-10-CM

## 2022-04-11 DIAGNOSIS — Z82.49 FAMILY HISTORY OF HYPERTROPHIC CARDIOMYOPATHY: ICD-10-CM

## 2022-04-11 DIAGNOSIS — Z00.00 MEDICARE ANNUAL WELLNESS VISIT, SUBSEQUENT: Primary | ICD-10-CM

## 2022-04-11 DIAGNOSIS — Z23 NEED FOR TDAP VACCINATION: ICD-10-CM

## 2022-04-11 DIAGNOSIS — H40.9 GLAUCOMA, UNSPECIFIED GLAUCOMA TYPE, UNSPECIFIED LATERALITY: ICD-10-CM

## 2022-04-11 PROCEDURE — G0439 PPPS, SUBSEQ VISIT: HCPCS | Performed by: FAMILY MEDICINE

## 2022-04-11 PROCEDURE — 1123F ACP DISCUSS/DSCN MKR DOCD: CPT | Performed by: FAMILY MEDICINE

## 2022-04-11 PROCEDURE — 4040F PNEUMOC VAC/ADMIN/RCVD: CPT | Performed by: FAMILY MEDICINE

## 2022-04-11 RX ORDER — ZOSTER VACCINE RECOMBINANT, ADJUVANTED 50 MCG/0.5
0.5 KIT INTRAMUSCULAR SEE ADMIN INSTRUCTIONS
Qty: 0.5 ML | Refills: 0 | Status: SHIPPED | OUTPATIENT
Start: 2022-04-11 | End: 2022-10-08

## 2022-04-11 ASSESSMENT — PATIENT HEALTH QUESTIONNAIRE - PHQ9
2. FEELING DOWN, DEPRESSED OR HOPELESS: 0
SUM OF ALL RESPONSES TO PHQ QUESTIONS 1-9: 0
1. LITTLE INTEREST OR PLEASURE IN DOING THINGS: 0
SUM OF ALL RESPONSES TO PHQ9 QUESTIONS 1 & 2: 0
SUM OF ALL RESPONSES TO PHQ QUESTIONS 1-9: 0

## 2022-04-11 ASSESSMENT — LIFESTYLE VARIABLES: HOW OFTEN DO YOU HAVE A DRINK CONTAINING ALCOHOL: NEVER

## 2022-04-11 NOTE — PATIENT INSTRUCTIONS
Personalized Preventive Plan for Augusto Anderson - 4/11/2022  Medicare offers a range of preventive health benefits. Some of the tests and screenings are paid in full while other may be subject to a deductible, co-insurance, and/or copay. Some of these benefits include a comprehensive review of your medical history including lifestyle, illnesses that may run in your family, and various assessments and screenings as appropriate. After reviewing your medical record and screening and assessments performed today your provider may have ordered immunizations, labs, imaging, and/or referrals for you. A list of these orders (if applicable) as well as your Preventive Care list are included within your After Visit Summary for your review. Other Preventive Recommendations:    · A preventive eye exam performed by an eye specialist is recommended every 1-2 years to screen for glaucoma; cataracts, macular degeneration, and other eye disorders. · A preventive dental visit is recommended every 6 months. · Try to get at least 150 minutes of exercise per week or 10,000 steps per day on a pedometer . · Order or download the FREE \"Exercise & Physical Activity: Your Everyday Guide\" from [employer]. Call 8-728.388.4897 or search [employer] online. · You need 8475-7240 mg of calcium and 7374-1655 IU of vitamin D per day. It is possible to meet your calcium requirement with diet alone, but a vitamin D supplement is usually necessary to meet this goal.  · When exposed to the sun, use a sunscreen that protects against both UVA and UVB radiation with an SPF of 30 or greater. Reapply every 2 to 3 hours or after sweating, drying off with a towel, or swimming. · Always wear a seat belt when traveling in a car. Always wear a helmet when riding a bicycle or motorcycle.

## 2022-04-11 NOTE — PROGRESS NOTES
Patient is noted to be overdue for Shingrix and Tdap, these will be pended for printing to be given at the pharmacy. She is also not had a COVID vaccination according to the chart that will be confirmed and recommended. She has not had a COVID vaccination and she declines my recommendation for that today. She states she had significant illness after prior influenza vaccinations and does not want to take that chance. She believes healthy eating and daily vitamins should protect her. She also believes she may have had COVID in December 2019. I encouraged her to consider getting the vaccine as she would likely become very ill if she contracts Covid. She took this under advisement. 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)), Shoulder Pain (still intermittent right shoulder pain), Other (FHx of HCM, sister recently passed awy, patient sees her cardio shortly for f-u, but her echo was ok last year with mild-mod valve leakage and ), and Insomnia (takes tiny dose of ambien -- 1/3 pill using nightly, thinks has refills)    Assessment & Plan   Medicare annual wellness visit, subsequent  Glaucoma, unspecified glaucoma type, unspecified laterality  Need for chickenpox vaccination  -     zoster recombinant adjuvanted vaccine ([address]) 50 MCG/0.5ML SUSR injection; Inject 0.5 mLs into the muscle See Admin Instructions 1 dose now and repeat in 2-6 months, Disp-0.5 mL, R-0Print  Need for Tdap vaccination  -     Tetanus-Diphth-Acell Pertussis ([address]) 5-2.5-18.5 LF-MCG/0.5 injection;  Inject 0.5 mLs into the muscle once for 1 dose, Disp-1 each, R-0Print  Family history of hypertrophic cardiomyopathy  Moderate aortic regurgitation  Adjustment insomnia  COVID-19 vaccination declined      Recommendations for Preventive Services Due: see orders and patient instructions/AVS.  Recommended screening schedule for the next 5-10 years is provided to the patient in written form: see Patient Instructions/AVS.     Return for Medicare Annual Wellness Visit in 1 year. 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 Has no chronic diseases except for glaucoma treated by eye doctor. Berna Smith is reviewed. Falls risk is negative or normal as is cognitive screen and depression screen. She denies alcohol or drug use. HRA questionnaire is negative or normal except for lack of eye exam.  She has a living will on file and medical decision maker is up-to-date. Patient's complete Health Risk Assessment and screening values have been reviewed and are found in Flowsheets. The following problems were reviewed today and where indicated follow up appointments were made and/or referrals ordered.     Positive Risk Factor Screenings with Interventions:               Hearing/Vision:  Do you or your family notice any trouble with your hearing that hasn't been managed with hearing aids?: No  Do you have difficulty driving, watching TV, or doing any of your daily activities because of your eyesight?: (!) Yes  Have you had an eye exam within the past year?: Yes  No exam data present      No Positive Risk Factors identified today. Objective   Vitals:    04/11/22 1026   BP: 136/82   Site: Left Upper Arm   Position: Sitting   Cuff Size: Medium Adult   Pulse: 68   SpO2: 94%   Weight: 139 lb (63 kg)   Height: 5' 4\" (1.626 m)      Body mass index is 23.86 kg/m². Allergies   Allergen Reactions    Prevnar 13 [Pneumococcal 13-Latisha Conj Vacc] Rash     Swelling of injection site and upper arm for more than 7 days. No infection.  Amoxicillin Rash    Flexeril [Cyclobenzaprine Hcl] Nausea And Vomiting     Prior to Visit Medications    Medication Sig Taking?  Authorizing Provider   zoster recombinant adjuvanted vaccine [facility]) 50 MCG/0.5ML SUSR injection Inject 0.5 mLs into the muscle See Admin Instructions 1 dose now and repeat in 2-6 months Yes Carlos Lynn MD   Tetanus-Diphth-Acell Pertussis (BOOSTRIX) 5-2.5-18.5 LF-MCG/0.5 injection Inject 0.5 mLs into the muscle once for 1 dose Yes Carlos Lynn MD   latanoprost (XALATAN) 0.005 % ophthalmic solution  Yes Historical Provider, MD   vitamin B-12 (CYANOCOBALAMIN) 100 MCG tablet Take 50 mcg by mouth daily Yes Historical Provider, MD   Ascorbic Acid (VITAMIN C) 500 MG tablet Take 500 mg by mouth daily Yes Historical Provider, MD   CINNAMON PO Take by mouth daily Yes Historical Provider, MD   Calcium Citrate-Vitamin D (CALCIUM + D PO) Take by mouth daily Yes Historical Provider, MD       [facility] (Including outside providers/suppliers regularly involved in providing care):   Patient Care Team:  Carlos Lynn MD as PCP - General (Family Medicine)  Carlos Lynn MD as PCP - [facility]  Angela Martinez MD as Consulting Physician (Gastroenterology)  Reuben West MD as Consulting Physician (Cardiology)  Keyonna Schaeffer MD as Surgeon (General Surgery)    Reviewed and updated this visit:  Tobacco  Allergies  Meds  Problems  Med Hx  Surg Hx  Soc Hx  Fam Hx

## 2022-05-31 DIAGNOSIS — G47.00 INSOMNIA, UNSPECIFIED TYPE: ICD-10-CM

## 2022-05-31 RX ORDER — ZOLPIDEM TARTRATE 10 MG/1
TABLET ORAL
Qty: 10 TABLET | Refills: 0 | Status: SHIPPED | OUTPATIENT
Start: 2022-05-31 | End: 2022-06-28 | Stop reason: SDUPTHER

## 2022-06-28 DIAGNOSIS — G47.00 INSOMNIA, UNSPECIFIED TYPE: ICD-10-CM

## 2022-06-28 RX ORDER — ZOLPIDEM TARTRATE 10 MG/1
TABLET ORAL
Qty: 10 TABLET | Refills: 0 | Status: SHIPPED | OUTPATIENT
Start: 2022-06-28 | End: 2022-08-01 | Stop reason: SDUPTHER

## 2022-07-13 ENCOUNTER — TELEPHONE (OUTPATIENT)
Dept: FAMILY MEDICINE CLINIC | Age: 82
End: 2022-07-13

## 2022-07-13 NOTE — TELEPHONE ENCOUNTER
Zolpidem  Request for PA recieved via fax. Accessed chart to deangelo PA need.  Outcome:  PA needed & initiated awaiting response

## 2022-08-01 DIAGNOSIS — G47.00 INSOMNIA, UNSPECIFIED TYPE: ICD-10-CM

## 2022-08-01 RX ORDER — ZOLPIDEM TARTRATE 10 MG/1
TABLET ORAL
Qty: 10 TABLET | Refills: 0 | Status: SHIPPED | OUTPATIENT
Start: 2022-08-01 | End: 2022-08-29 | Stop reason: SDUPTHER

## 2022-08-29 ENCOUNTER — OFFICE VISIT (OUTPATIENT)
Dept: FAMILY MEDICINE CLINIC | Age: 82
End: 2022-08-29
Payer: MEDICARE

## 2022-08-29 VITALS
OXYGEN SATURATION: 98 % | SYSTOLIC BLOOD PRESSURE: 104 MMHG | WEIGHT: 141 LBS | DIASTOLIC BLOOD PRESSURE: 62 MMHG | HEART RATE: 69 BPM | BODY MASS INDEX: 24.2 KG/M2

## 2022-08-29 DIAGNOSIS — G47.00 INSOMNIA, UNSPECIFIED TYPE: ICD-10-CM

## 2022-08-29 DIAGNOSIS — R30.0 DYSURIA: Primary | ICD-10-CM

## 2022-08-29 DIAGNOSIS — N39.0 URINARY TRACT INFECTION WITHOUT HEMATURIA, SITE UNSPECIFIED: ICD-10-CM

## 2022-08-29 LAB
BILIRUBIN, POC: NEGATIVE
BLOOD URINE, POC: NEGATIVE
CLARITY, POC: CLEAR
COLOR, POC: YELLOW
GLUCOSE URINE, POC: NEGATIVE
KETONES, POC: NEGATIVE
LEUKOCYTE EST, POC: ABNORMAL
NITRITE, POC: NEGATIVE
PH, POC: 6.5
PROTEIN, POC: NEGATIVE
SPECIFIC GRAVITY, POC: 1.02
UROBILINOGEN, POC: ABNORMAL

## 2022-08-29 PROCEDURE — 99213 OFFICE O/P EST LOW 20 MIN: CPT | Performed by: FAMILY MEDICINE

## 2022-08-29 PROCEDURE — G8420 CALC BMI NORM PARAMETERS: HCPCS | Performed by: FAMILY MEDICINE

## 2022-08-29 PROCEDURE — 81002 URINALYSIS NONAUTO W/O SCOPE: CPT | Performed by: FAMILY MEDICINE

## 2022-08-29 PROCEDURE — G8399 PT W/DXA RESULTS DOCUMENT: HCPCS | Performed by: FAMILY MEDICINE

## 2022-08-29 PROCEDURE — 1123F ACP DISCUSS/DSCN MKR DOCD: CPT | Performed by: FAMILY MEDICINE

## 2022-08-29 PROCEDURE — 1090F PRES/ABSN URINE INCON ASSESS: CPT | Performed by: FAMILY MEDICINE

## 2022-08-29 PROCEDURE — 1036F TOBACCO NON-USER: CPT | Performed by: FAMILY MEDICINE

## 2022-08-29 PROCEDURE — G8427 DOCREV CUR MEDS BY ELIG CLIN: HCPCS | Performed by: FAMILY MEDICINE

## 2022-08-29 RX ORDER — BRIMONIDINE TARTRATE 2 MG/ML
SOLUTION/ DROPS OPHTHALMIC
COMMUNITY
Start: 2022-07-20

## 2022-08-29 RX ORDER — SULFAMETHOXAZOLE AND TRIMETHOPRIM 800; 160 MG/1; MG/1
1 TABLET ORAL 2 TIMES DAILY
Qty: 10 TABLET | Refills: 0 | Status: SHIPPED | OUTPATIENT
Start: 2022-08-29 | End: 2022-09-03

## 2022-08-29 RX ORDER — ZOLPIDEM TARTRATE 10 MG/1
TABLET ORAL
Qty: 10 TABLET | Refills: 0 | Status: SHIPPED | OUTPATIENT
Start: 2022-08-29 | End: 2022-09-29 | Stop reason: SDUPTHER

## 2022-08-29 SDOH — ECONOMIC STABILITY: FOOD INSECURITY: WITHIN THE PAST 12 MONTHS, THE FOOD YOU BOUGHT JUST DIDN'T LAST AND YOU DIDN'T HAVE MONEY TO GET MORE.: PATIENT DECLINED

## 2022-08-29 SDOH — ECONOMIC STABILITY: FOOD INSECURITY: WITHIN THE PAST 12 MONTHS, YOU WORRIED THAT YOUR FOOD WOULD RUN OUT BEFORE YOU GOT MONEY TO BUY MORE.: PATIENT DECLINED

## 2022-08-29 ASSESSMENT — SOCIAL DETERMINANTS OF HEALTH (SDOH): HOW HARD IS IT FOR YOU TO PAY FOR THE VERY BASICS LIKE FOOD, HOUSING, MEDICAL CARE, AND HEATING?: PATIENT DECLINED

## 2022-08-29 NOTE — LETTER
[facility]  [address] [facility],[address] [facility]  Phone: [phone]  Fax: [phone]    Yuly Mcclure MD        September 9, 2022     Patient: Mali Lane   YOB: 1940   Date of Visit: 8/29/2022       To Whom it May Concern: Brian Hull was seen in my clinic on 8/29/2022. She was suffering from an illness that would make a vehicle trip very uncomfortable for her so she was advised not to take her planned bus tour. If you have any questions or concerns, please don't hesitate to call.     Sincerely,         Yuly Mcclure MD

## 2022-08-30 LAB — URINE CULTURE, ROUTINE: NORMAL

## 2022-09-09 ENCOUNTER — TELEPHONE (OUTPATIENT)
Dept: FAMILY MEDICINE CLINIC | Age: 82
End: 2022-09-09

## 2022-09-09 ASSESSMENT — ENCOUNTER SYMPTOMS
BACK PAIN: 0
CHEST TIGHTNESS: 0
SHORTNESS OF BREATH: 0
COUGH: 0
ABDOMINAL PAIN: 0
WHEEZING: 0

## 2022-09-16 NOTE — RESULT ENCOUNTER NOTE
Urine culture was actually negative for infection. I am sure she is finished with her antibiotic. As long she is doing well no follow-up is needed, if symptoms recur we may need to see in the office. Not routed for staff call but released to [facility] only.

## 2022-09-28 DIAGNOSIS — G47.00 INSOMNIA, UNSPECIFIED TYPE: ICD-10-CM

## 2022-09-29 RX ORDER — ZOLPIDEM TARTRATE 10 MG/1
TABLET ORAL
Qty: 10 TABLET | Refills: 3 | Status: SHIPPED | OUTPATIENT
Start: 2022-09-29 | End: 2022-10-28

## 2022-11-26 ENCOUNTER — HOSPITAL ENCOUNTER (EMERGENCY)
Age: 82
Discharge: HOME | End: 2022-11-26
Payer: MEDICARE

## 2022-11-26 ENCOUNTER — APPOINTMENT (OUTPATIENT)
Dept: GENERAL RADIOLOGY | Age: 82
End: 2022-11-26
Payer: MEDICARE

## 2022-11-26 VITALS
OXYGEN SATURATION: 95 % | HEIGHT: 65 IN | DIASTOLIC BLOOD PRESSURE: 71 MMHG | RESPIRATION RATE: 16 BRPM | BODY MASS INDEX: 22.49 KG/M2 | SYSTOLIC BLOOD PRESSURE: 125 MMHG | HEART RATE: 69 BPM | TEMPERATURE: 98.3 F | WEIGHT: 135 LBS

## 2022-11-26 DIAGNOSIS — J11.1 INFLUENZA: Primary | ICD-10-CM

## 2022-11-26 DIAGNOSIS — R68.89 FLU-LIKE SYMPTOMS: ICD-10-CM

## 2022-11-26 LAB
ALBUMIN SERPL-MCNC: 3.7 GM/DL (ref 3.4–5)
ALP BLD-CCNC: 50 IU/L (ref 40–129)
ALT SERPL-CCNC: 20 U/L (ref 10–40)
ANION GAP SERPL CALCULATED.3IONS-SCNC: 12 MMOL/L (ref 4–16)
AST SERPL-CCNC: 27 IU/L (ref 15–37)
BACTERIA: ABNORMAL /HPF
BASOPHILS ABSOLUTE: 0 K/CU MM
BASOPHILS RELATIVE PERCENT: 0.4 % (ref 0–1)
BILIRUB SERPL-MCNC: 0.3 MG/DL (ref 0–1)
BILIRUBIN URINE: ABNORMAL MG/DL
BLOOD, URINE: NEGATIVE
BUN BLDV-MCNC: 14 MG/DL (ref 6–23)
CALCIUM SERPL-MCNC: 8.5 MG/DL (ref 8.3–10.6)
CHLORIDE BLD-SCNC: 100 MMOL/L (ref 99–110)
CLARITY: CLEAR
CO2: 25 MMOL/L (ref 21–32)
COLOR: YELLOW
CREAT SERPL-MCNC: 0.9 MG/DL (ref 0.6–1.1)
DIFFERENTIAL TYPE: ABNORMAL
EKG ATRIAL RATE: 66 BPM
EKG DIAGNOSIS: NORMAL
EKG P AXIS: 92 DEGREES
EKG P-R INTERVAL: 194 MS
EKG Q-T INTERVAL: 416 MS
EKG QRS DURATION: 82 MS
EKG QTC CALCULATION (BAZETT): 436 MS
EKG R AXIS: 4 DEGREES
EKG T AXIS: 54 DEGREES
EKG VENTRICULAR RATE: 66 BPM
EOSINOPHILS ABSOLUTE: 0 K/CU MM
EOSINOPHILS RELATIVE PERCENT: 0.4 % (ref 0–3)
GFR SERPL CREATININE-BSD FRML MDRD: >60 ML/MIN/1.73M2
GLUCOSE BLD-MCNC: 105 MG/DL (ref 70–99)
GLUCOSE, URINE: NEGATIVE MG/DL
HCT VFR BLD CALC: 44.5 % (ref 37–47)
HEMOGLOBIN: 14 GM/DL (ref 12.5–16)
IMMATURE NEUTROPHIL %: 0.4 % (ref 0–0.43)
KETONES, URINE: NEGATIVE MG/DL
LEUKOCYTE ESTERASE, URINE: ABNORMAL
LYMPHOCYTES ABSOLUTE: 0.9 K/CU MM
LYMPHOCYTES RELATIVE PERCENT: 36.2 % (ref 24–44)
MCH RBC QN AUTO: 26.1 PG (ref 27–31)
MCHC RBC AUTO-ENTMCNC: 31.5 % (ref 32–36)
MCV RBC AUTO: 82.9 FL (ref 78–100)
MONOCYTES ABSOLUTE: 0.3 K/CU MM
MONOCYTES RELATIVE PERCENT: 13 % (ref 0–4)
MUCUS: ABNORMAL HPF
NITRITE URINE, QUANTITATIVE: NEGATIVE
NUCLEATED RBC %: 0 %
PDW BLD-RTO: 13.7 % (ref 11.7–14.9)
PH, URINE: 6 (ref 5–8)
PLATELET # BLD: 112 K/CU MM (ref 140–440)
PMV BLD AUTO: 9.7 FL (ref 7.5–11.1)
POTASSIUM SERPL-SCNC: 3.6 MMOL/L (ref 3.5–5.1)
PROTEIN UA: NEGATIVE MG/DL
RBC # BLD: 5.37 M/CU MM (ref 4.2–5.4)
RBC URINE: <1 /HPF (ref 0–6)
SEGMENTED NEUTROPHILS ABSOLUTE COUNT: 1.3 K/CU MM
SEGMENTED NEUTROPHILS RELATIVE PERCENT: 49.6 % (ref 36–66)
SODIUM BLD-SCNC: 137 MMOL/L (ref 135–145)
SPECIFIC GRAVITY UA: 1.01 (ref 1–1.03)
SQUAMOUS EPITHELIAL: 1 /HPF
TOTAL IMMATURE NEUTOROPHIL: 0.01 K/CU MM
TOTAL NUCLEATED RBC: 0 K/CU MM
TOTAL PROTEIN: 6.4 GM/DL (ref 6.4–8.2)
TRICHOMONAS: ABNORMAL /HPF
TROPONIN T: <0.01 NG/ML
UROBILINOGEN, URINE: 0.2 MG/DL (ref 0.2–1)
WBC # BLD: 2.5 K/CU MM (ref 4–10.5)
WBC UA: 3 /HPF (ref 0–5)

## 2022-11-26 PROCEDURE — 71045 X-RAY EXAM CHEST 1 VIEW: CPT

## 2022-11-26 PROCEDURE — 80053 COMPREHEN METABOLIC PANEL: CPT

## 2022-11-26 PROCEDURE — 84484 ASSAY OF TROPONIN QUANT: CPT

## 2022-11-26 PROCEDURE — 85025 COMPLETE CBC W/AUTO DIFF WBC: CPT

## 2022-11-26 PROCEDURE — 6370000000 HC RX 637 (ALT 250 FOR IP): Performed by: PHYSICIAN ASSISTANT

## 2022-11-26 PROCEDURE — 99285 EMERGENCY DEPT VISIT HI MDM: CPT

## 2022-11-26 PROCEDURE — 93005 ELECTROCARDIOGRAM TRACING: CPT | Performed by: PHYSICIAN ASSISTANT

## 2022-11-26 PROCEDURE — 81001 URINALYSIS AUTO W/SCOPE: CPT

## 2022-11-26 PROCEDURE — 93010 ELECTROCARDIOGRAM REPORT: CPT | Performed by: INTERNAL MEDICINE

## 2022-11-26 PROCEDURE — 81003 URINALYSIS AUTO W/O SCOPE: CPT

## 2022-11-26 RX ORDER — ACETAMINOPHEN 325 MG/1
650 TABLET ORAL ONCE
Status: COMPLETED | OUTPATIENT
Start: 2022-11-26 | End: 2022-11-26

## 2022-11-26 RX ADMIN — ACETAMINOPHEN 650 MG: 325 TABLET ORAL at 10:47

## 2022-11-26 ASSESSMENT — PAIN DESCRIPTION - FREQUENCY: FREQUENCY: CONTINUOUS

## 2022-11-26 ASSESSMENT — PAIN DESCRIPTION - DESCRIPTORS: DESCRIPTORS: ACHING

## 2022-11-26 ASSESSMENT — PAIN DESCRIPTION - PAIN TYPE: TYPE: ACUTE PAIN

## 2022-11-26 ASSESSMENT — PAIN DESCRIPTION - LOCATION: LOCATION: ABDOMEN;BACK

## 2022-11-26 ASSESSMENT — PAIN SCALES - GENERAL: PAINLEVEL_OUTOF10: 8

## 2022-11-26 NOTE — DISCHARGE INSTRUCTIONS
As we discussed, return to emergency department any confusion, difficulty breathing, chest pain, worsening abdominal pain, difficulty walking, passing out or any new concerns. Otherwise follow-up with your primary care provider for reevaluation of any persisting symptoms, as well as to discuss the results of your chest x-ray. Continue your prescription of Tamiflu provided to you previously. I would advise to continue acetaminophen taking 650 to 1000 mg every 4-6 hours. Do not exceed a total of 4000 mg of acetaminophen in a 24-hour period. Stay hydrated as best you can, get plenty of rest.    For congestion, you could use saline nasal sprays, irrigation or rinses. For cough, you could use vaporizers, humidifiers, warm baths/showers. Stay hydrated, get plenty of rest, eat small frequent nutritious meals and snacks. Hot liquids (tea, broth, soup) if age-appropriate may also help with your symptoms. Practice good hand hygiene; avoid touching your mouth nose and eyes. Wear a mask.

## 2022-11-26 NOTE — ED PROVIDER NOTES
[address] ENCOUNTER        Pt Name: Adis Vazquez  MRN: [id number]  [place] 1940  Date of evaluation: 11/26/2022  Provider: Saturnino Sanchez PA-C  PCP: Kasie Mary MD  Note Started: 9:57 AM EST      MARY. I have evaluated this patient. My supervising physician was available for consultation. Triage CHIEF COMPLAINT       Chief Complaint   Patient presents with    Influenza     States [place] [facility] [place] and has influenza A         HISTORY OF PRESENT ILLNESS   (Location/Symptom, Timing/Onset, Context/Setting, Quality, Duration, Modifying Factors, Severity)  Note limiting factors. Chief Complaint: flu    Adis Vazquez is a 78 y.o. female who presents c/o or worsening flu. Had been diagnosed with flu on thanksgiving. Feels more weak and is becoming worse. Has had back pain and that is worse. Cough is worse. Patient and son mention sob started. Abdominal pain intermittent dull abdominal.   Had diarrhea on thanksgiving but since then has had normal valve.,  Has been using Tamiflu and albuterol. Weakness described as not having. Patient lives alone son. Denies  chest pain, fevers, n/v, urinary changes, near syncope. Nursing Notes were all reviewed and agreed with or any disagreements were addressed in the HPI. REVIEW OF SYSTEMS    (2-9 systems for level 4, 10 or more for level 5)     Review of Systems   Constitutional:  Negative for chills and fever. HENT:  Negative for congestion and rhinorrhea. Eyes:  Negative for pain and visual disturbance. Respiratory:  Positive for cough. Negative for shortness of breath. Cardiovascular:  Negative for chest pain and leg swelling. Gastrointestinal:  Negative for abdominal pain, diarrhea, nausea and vomiting. Genitourinary:  Negative for dysuria and hematuria. Musculoskeletal:  Negative for back pain and myalgias. Skin:  Negative for rash and wound. Neurological:  Positive for weakness (generalized). Negative for dizziness and light-headedness. PAST MEDICAL HISTORY     Past Medical History:   Diagnosis Date    Cervical cancer ([facility] [address]     Dysuria     Edema     Female bladder prolapse     Hernia, hiatal     Insomnia     Lipoma of neck     Localized primary osteoarthritis of carpometacarpal joint of right thumb 8/22/2021    Lumbago     Nodule of kidney     Osteoarthritis     Plantar fasciitis     PVD (peripheral vascular disease) ([facility] [address]     Sinusitis     Varicose veins of both lower extremities 2014    Dr Erica Canales     Past Surgical History:   Procedure Laterality Date    BLADDER REPAIR  2005    CARPAL TUNNEL RELEASE Right 8/30/2021    RIGHT CARPAL TUNNEL RELEASE performed by Dewitt Nageotte, MD at [address] [place] [address] Road Bilateral     Inguinal hernia repairs    HIATAL HERNIA REPAIR  07/2020    HYSTERECTOMY, TOTAL ABDOMINAL (CERVIX REMOVED)  10/10/2015    cervix ca-adenoid basal epithelioma    KNEE ARTHROSCOPY Left 04/07/2014    Dr Baljit Carrington      age 59 ??  cancer       CURRENTMEDICATIONS       Discharge Medication List as of 11/26/2022  2:05 PM        CONTINUE these medications which have NOT CHANGED    Details   brimonidine (ALPHAGAN) 0.2 % ophthalmic solution Historical Med      UNABLE TO FIND Balance of natureHistorical Med      latanoprost (XALATAN) 0.005 % ophthalmic solution Historical Med      vitamin B-12 (CYANOCOBALAMIN) 100 MCG tablet Take 50 mcg by mouth dailyHistorical Med      Ascorbic Acid (VITAMIN C) 500 MG tablet Take 500 mg by mouth dailyHistorical Med             ALLERGIES     Prevnar 13 [pneumococcal 13-catarina conj vacc], Amoxicillin, and Flexeril [cyclobenzaprine hcl]    FAMILYHISTORY       Family History   Problem Relation Age of Onset    Diabetes Mother     Colon Cancer Mother     Heart Failure Father     Cancer Sister         tonsil, galbladder    Diabetes Sister Heart Failure Sister     Hypertension Sister     Migraines Sister     Diabetes Brother     Heart Failure Brother     Cancer Other         several aunts and uncles    Breast Cancer Brother 76    Ovarian Cancer Neg Hx         SOCIAL HISTORY       Social History     Socioeconomic History    Marital status:      Spouse name: None    Number of children: None    Years of education: None    Highest education level: None   Tobacco Use    Smoking status: Never    Smokeless tobacco: Never   Vaping Use    Vaping Use: Never used   Substance and Sexual Activity    Alcohol use: No    Drug use: No    Sexual activity: Never     Social Determinants of Health     Financial Resource Strain: Unknown    Difficulty of Paying Living Expenses: Patient refused   Food Insecurity: Unknown    Worried About Running Out of Food in the Last Year: Patient refused    Ran Out of Food in the Last Year: Patient refused   Physical Activity: Insufficiently Active    Days of Exercise per Week: 2 days    Minutes of Exercise per Session: 30 min       SCREENINGS    Mi Coma Scale  Eye Opening: Spontaneous  Best Verbal Response: Oriented  Best Motor Response: Obeys commands  Mi Coma Scale Score: 15        PHYSICAL EXAM    (up to 7 for level 4, 8 or more for level 5)     ED Triage Vitals [11/26/22 0952]   BP Temp Temp Source Heart Rate Resp SpO2 Height Weight   120/76 98.3 °F (36.8 °C) Oral 69 16 96 % 5' 4.5\" (1.638 m) 135 lb (61.2 kg)       Physical Exam  Vitals and nursing note reviewed. Constitutional:       Appearance: Normal appearance. She is well-developed. She is not ill-appearing or diaphoretic. HENT:      Head: Normocephalic and atraumatic. Eyes:      General: No scleral icterus. Right eye: No discharge. Left eye: No discharge. Cardiovascular:      Rate and Rhythm: Normal rate and regular rhythm. Heart sounds: Normal heart sounds. No murmur heard. No friction rub. No gallop.    Pulmonary:      Effort: Pulmonary effort is normal. No respiratory distress. Breath sounds: Normal breath sounds. No stridor. No wheezing or rales. Chest:      Chest wall: No tenderness. Abdominal:      General: Bowel sounds are normal. There is no distension. Palpations: Abdomen is soft. There is no mass. Tenderness: There is no abdominal tenderness. There is no guarding or rebound. Musculoskeletal:         General: No tenderness. Normal range of motion. Cervical back: Normal range of motion and neck supple. Skin:     General: Skin is warm and dry. Coloration: Skin is not pale. Neurological:      Mental Status: She is alert and oriented to person, place, and time. Coordination: Coordination normal.   Psychiatric:         Mood and Affect: Mood normal.         Behavior: Behavior normal.       EMERGENCY DEPARTMENT COURSE:             Pt is a 78 y.o. female who presents with above HPI. Vital signs and nursing note reviewed. I did review EMR, patient recently positive for influenza A, negative for COVID-19, started on Tamiflu and antitussives. Emergent conditions considered. Work-up initiated. Tylenol ordered. Will consider case management consult to evaluate for potential home health care. Patient was given thefollowing medications:  Medications   acetaminophen (TYLENOL) tablet 650 mg (650 mg Oral Given 11/26/22 1047)       @1400 chest x-ray shows potential bronchitis and pneumonitis, but no focal consolidations. EKG within normal limits. Normal troponin. Urinalysis showed small leukocytes and rare bacteria, no WBCs no nitrites. This point less likely UTI, however culture pending. CMP within normal limits. Patient ambulated in department, does not become hypoxic during ambulation. And on my final examination patient is ambulating in the hallways with no difficulty, and she tells me she feels comfortable, and is wanting to go home. I feel this is reasonable.     DIFFERENTIAL DIAGNOSIS/MDM: COVID-19, influenza, deconditioning, viral syndrome, respiratory symptoms, pneumonitis, bronchitis, bacterial pneumonia, UTI. Low suspicion for PE, ACS, sepsis, patient is not hypoxic tachypneic or tachycardic. Repeat examination ambulating in ED without difficulty. Case management consulted to assist with any home health care needs, and I did discuss with patient. Patient comfortable with returning home. Son at bedside also mentioning he will be staying with his mom for the next day. Follow-up plan return cautions provided discussed in detail patient in agreement. Is this patient to be included in the SEP-1 Core Measure due to severe sepsis or septic shock?    No   Exclusion criteria - the patient is NOT to be included for SEP-1 Core Measure due to:  Viral etiology found or highly suspected (including COVID-19) without concomitant bacterial infection    Vitals:    Vitals:    11/26/22 0952 11/26/22 1132 11/26/22 1144   BP: 120/76 125/71    Pulse: 69     Resp: 16     Temp: 98.3 °F (36.8 °C)     TempSrc: Oral     SpO2: 96% 92% 95%   Weight: 135 lb (61.2 kg)     Height: 5' 4.5\" (1.638 m)         CONSULTS:   IP CONSULT TO CASE MANAGEMENT     CRITICAL CARE TIME   N/A      DIAGNOSTIC RESULTS   LABS:    Labs Reviewed   CBC WITH AUTO DIFFERENTIAL - Abnormal; Notable for the following components:       Result Value    WBC 2.5 (*)     MCH 26.1 (*)     MCHC 31.5 (*)     Platelets 911 (*)     Monocytes % 13.0 (*)     All other components within normal limits   COMPREHENSIVE METABOLIC PANEL - Abnormal; Notable for the following components:    Glucose 105 (*)     All other components within normal limits   URINALYSIS WITH REFLEX TO CULTURE - Abnormal; Notable for the following components:    Bilirubin Urine MODERATE NUMBER OR AMOUNT OBSERVED (*)     Leukocyte Esterase, Urine SMALL NUMBER OR AMOUNT OBSERVED (*)     Bacteria, UA RARE (*)     Mucus, UA RARE (*)     All other components within normal limits TROPONIN       When ordered, only abnormal lab results are displayed. All other labs were within normal range or not returned as of this dictation. EKG: When ordered, EKG's are interpreted by the Emergency Department Physician in the absence of a cardiologist.  Please see their note for interpretation of EKG. RADIOLOGY:   Non-plain film images such as CT, Ultrasound and MRI are read by the radiologist. Plain radiographic images are visualized andpreliminarily interpreted by the  ED Provider with the below findings:      Interpretation perthe Radiologist below, if available at the time of this note:    XR CHEST PORTABLE   Final Result   Findings consistent with bronchitis/pneumonitis worst in the right basilar   region. Findings may be accentuated by underlying chronic lung disease. Borderline cardiomegaly without radiographic evidence of pulmonary edema. No results found. PROCEDURES   Unless otherwise noted below, none     Procedures      FINAL IMPRESSION      1. Influenza    2. Flu-like symptoms          DISPOSITION/PLAN   DISPOSITION Decision To Discharge 11/26/2022 01:59:09 PM      PATIENT REFERREDTO:  No follow-up provider specified.     DISCHARGE MEDICATIONS:  Discharge Medication List as of 11/26/2022  2:05 PM          DISCONTINUED MEDICATIONS:  Discharge Medication List as of 11/26/2022  2:05 PM                 (Please note that portions ofthis note were completed with a voice recognition program.  Efforts were made to edit the dictations but occasionally words are mis-transcribed.)    Baylee Hui PA-C (electronically signed)             Baylee Hui PA-C  11/27/22 5679

## 2022-11-26 NOTE — ED PROVIDER NOTES
ED attending EKG interpretation (I otherwise did not participate in the care of this patient)    EKG Interpretation  Interpreted by me  Compared to 8/25/6  Rhythm: normal sinus   Rate: normal 66  Axis: normal  Ectopy: none  Conduction: sinus arrhythmia  ST Segments: no acute change  T Waves: no acute change  Clinical Impression: normal sinus rhythm with sinus arrhythmia, no acute change     Oswaldo Walters MD  11/26/22 5630

## 2022-11-26 NOTE — ED NOTES
Pt ambulated to restroom with steady gait, denied SOB. RR unlabored. Pt had an O2 sat of 96%.  Pt was unable to provide a urine sample at this time      Oneil Ospina RN  11/26/22 1439

## 2022-11-26 NOTE — CARE COORDINATION
CM consult per Gustavo Prince to assist with discharge planning for pt positive for influenza. Review of pt chart pt, has been seen in ER on 11/24/22 dx w/influenza prescribed Tamiflu, inhaler and cough syrup and ambien for sleep. Pt returns to ER today with same complaint. BÁRBARA spoke with Earnestine Smith who states pt lives alone and family concern she is having difficulty getting around. While pt has been in ER pt has been able to ambulate per self to bathroom and back per self a couple of times with out assistance. Orly LEARY did pulse check while pt ambulation,O2 maintained at 96% on R/A. Cm visited pt who is alert and oriented, son at bedside. CM discussed pt discharging home to continue Rx given on 11/24/22. CM offer to initiate [facility] RN. Pt stated she was not interested in [facility] at this time but would think about it. CM recommended to follow up with PCP on Monday if decide HHC is needed PCP will place an order. Pt and son verbalized understanding. Update to Errol Giordano CM/RN collected pt urine and sent to Shayy Albrecht RN, released urine order for lab to [place] [id number] UA on pt. CM spoke with pt in dang as she was just up ambulating per self Just to pass the time. Pt walked with steady gait, no DME used to ambulate.  BAM,RN/BÁRBARA

## 2022-11-27 ASSESSMENT — ENCOUNTER SYMPTOMS
EYE PAIN: 0
BACK PAIN: 0
SHORTNESS OF BREATH: 0
RHINORRHEA: 0
ABDOMINAL PAIN: 0
VOMITING: 0
COUGH: 1
NAUSEA: 0
DIARRHEA: 0

## 2022-11-30 ENCOUNTER — OFFICE VISIT (OUTPATIENT)
Dept: FAMILY MEDICINE CLINIC | Age: 82
End: 2022-11-30
Payer: MEDICARE

## 2022-11-30 VITALS
DIASTOLIC BLOOD PRESSURE: 88 MMHG | BODY MASS INDEX: 22.65 KG/M2 | OXYGEN SATURATION: 97 % | SYSTOLIC BLOOD PRESSURE: 128 MMHG | WEIGHT: 134 LBS | HEART RATE: 80 BPM

## 2022-11-30 DIAGNOSIS — N89.8 VAGINAL ITCHING: ICD-10-CM

## 2022-11-30 DIAGNOSIS — G93.31 POSTVIRAL FATIGUE SYNDROME: ICD-10-CM

## 2022-11-30 DIAGNOSIS — R63.0 ANOREXIA: ICD-10-CM

## 2022-11-30 DIAGNOSIS — J10.1 INFLUENZA A: Primary | ICD-10-CM

## 2022-11-30 DIAGNOSIS — N76.1 CHRONIC VAGINITIS: ICD-10-CM

## 2022-11-30 DIAGNOSIS — G47.00 INSOMNIA, UNSPECIFIED TYPE: ICD-10-CM

## 2022-11-30 PROCEDURE — G8420 CALC BMI NORM PARAMETERS: HCPCS | Performed by: FAMILY MEDICINE

## 2022-11-30 PROCEDURE — 99214 OFFICE O/P EST MOD 30 MIN: CPT | Performed by: FAMILY MEDICINE

## 2022-11-30 PROCEDURE — 1036F TOBACCO NON-USER: CPT | Performed by: FAMILY MEDICINE

## 2022-11-30 PROCEDURE — 1090F PRES/ABSN URINE INCON ASSESS: CPT | Performed by: FAMILY MEDICINE

## 2022-11-30 PROCEDURE — G8427 DOCREV CUR MEDS BY ELIG CLIN: HCPCS | Performed by: FAMILY MEDICINE

## 2022-11-30 PROCEDURE — 1123F ACP DISCUSS/DSCN MKR DOCD: CPT | Performed by: FAMILY MEDICINE

## 2022-11-30 PROCEDURE — G8484 FLU IMMUNIZE NO ADMIN: HCPCS | Performed by: FAMILY MEDICINE

## 2022-11-30 PROCEDURE — G8399 PT W/DXA RESULTS DOCUMENT: HCPCS | Performed by: FAMILY MEDICINE

## 2022-11-30 RX ORDER — ZOLPIDEM TARTRATE 10 MG/1
TABLET ORAL
COMMUNITY
Start: 2022-11-29

## 2022-11-30 RX ORDER — OSELTAMIVIR PHOSPHATE 75 MG/1
CAPSULE ORAL
COMMUNITY
Start: 2022-11-24 | End: 2022-11-30

## 2022-11-30 RX ORDER — CODEINE PHOSPHATE AND GUAIFENESIN 10; 100 MG/5ML; MG/5ML
SOLUTION ORAL
COMMUNITY
Start: 2022-11-24 | End: 2022-11-30

## 2022-11-30 RX ORDER — FLUCONAZOLE 150 MG/1
150 TABLET ORAL DAILY
Qty: 5 TABLET | Refills: 0 | Status: SHIPPED | OUTPATIENT
Start: 2022-11-30

## 2022-11-30 RX ORDER — ALBUTEROL SULFATE 90 UG/1
2 AEROSOL, METERED RESPIRATORY (INHALATION) EVERY 6 HOURS PRN
COMMUNITY
Start: 2022-11-24 | End: 2022-11-30

## 2022-11-30 NOTE — PROGRESS NOTES
Chief Complaint   Patient presents with    Follow-Up from [address] for flu, pt in ER twice for flu sx, pt states she does feel somewhat better now     Fatigue     Still very weak, low appetite    Vaginitis     C/o ongoing yeast, was not able to use medication as she has bladder prolapse       Yocha Dehe NARRATIVE:    Moving to [place] in Feb or March. This is new since family locally not helping with this acute illness but she has firmly decided to move near her other family. Her son brought her today but was not available for me to speak to. Finished tamiflu. Feels a little bit better but not well. Continues to have vaginal symptoms as above. She was in the ER only so not eligible for hospital follow-up visit care. Patient is established unless otherwise noted. Above chief complaint and Yocha Dehe obtained by this physician provider. Review of Systems   Constitutional:  Positive for diaphoresis, fatigue and unexpected weight change (Weight down a few pounds from previous reading). Negative for activity change, chills and fever. HENT:  Negative for congestion. Respiratory:  Negative for cough, chest tightness, shortness of breath and wheezing. Cardiovascular:  Negative for chest pain and leg swelling. Gastrointestinal:  Negative for abdominal pain. Genitourinary:  Positive for vaginal discharge. Musculoskeletal:  Negative for back pain and myalgias. Skin:  Negative for rash. Psychiatric/Behavioral:  Negative for behavioral problems and dysphoric mood. Allergies   Allergen Reactions    Prevnar 13 [Pneumococcal 13-Latisha Conj Vacc] Rash     Swelling of injection site and upper arm for more than 7 days. No infection. Amoxicillin Rash    Flexeril [Cyclobenzaprine Hcl] Nausea And Vomiting     Allergy historyupdated.     Outpatient Medications Marked as Taking for the 11/30/22 encounter (Office Visit) with Herman Nix MD   Medication Sig Dispense Refill    zolpidem (AMBIEN) 10 MG tablet       fluconazole (DIFLUCAN) 150 MG tablet Take 1 tablet by mouth daily 5 tablet 0    brimonidine (ALPHAGAN) 0.2 % ophthalmic solution       UNABLE TO FIND Balance of nature      latanoprost (XALATAN) 0.005 % ophthalmic solution       vitamin B-12 (CYANOCOBALAMIN) 100 MCG tablet Take 50 mcg by mouth daily      Ascorbic Acid (VITAMIN C) 500 MG tablet Take 500 mg by mouth daily         Tobacco use history updated. Social History     Tobacco Use   Smoking Status Never   Smokeless Tobacco Never        Nursing note reviewed. Vitals:    11/30/22 1023   BP: 128/88   Site: Left Upper Arm   Position: Sitting   Cuff Size: Medium Adult   Pulse: 80   SpO2: 97%   Weight: 134 lb (60.8 kg)          BP Readings from Last 3 Encounters:   11/30/22 128/88   11/26/22 125/71   08/29/22 104/62     Wt Readings from Last 3 Encounters:   11/30/22 134 lb (60.8 kg)   11/26/22 135 lb (61.2 kg)   08/29/22 141 lb (64 kg)     Body mass index is 22.65 kg/m². No results found for this visit on 11/30/22. Physical Exam  Vitals and nursing note reviewed. Constitutional:       General: She is not in acute distress. Appearance: She is well-developed. She is not diaphoretic. HENT:      Head: Normocephalic. Eyes:      General:         Right eye: No discharge. Left eye: No discharge. Conjunctiva/sclera: Conjunctivae normal.      Pupils: Pupils are equal, round, and reactive to light. Cardiovascular:      Rate and Rhythm: Normal rate and regular rhythm. Heart sounds: Normal heart sounds. No murmur heard. Pulmonary:      Effort: Pulmonary effort is normal. No respiratory distress. Breath sounds: Normal breath sounds. No wheezing or rales. Musculoskeletal:      Cervical back: Normal range of motion. Skin:     General: Skin is warm and dry. Neurological:      Mental Status: She is alert and oriented to person, place, and time.    Psychiatric:         Behavior: Behavior normal.      Comments: Affect is a little depressed and irritated         _________________________________________________  Assessment:     1. Influenza A  2. Chronic vaginitis  3. Postviral fatigue syndrome  4. Anorexia  5. Vaginal itching  -     fluconazole (DIFLUCAN) 150 MG tablet; Take 1 tablet by mouth daily, Disp-5 tablet, R-0Normal  6. Insomnia, unspecified type      We did order Diflucan as above. She continues low-dose Ambien. Support given for her stressors and recent illness. Her family seems to be gathered around her and if she chooses to move as planning I hope she does well down there. She should make sure to drink plenty of fluids and eat a reasonable amount of food or her weakness will worsen. Problems listed above are stable and therapeutic plan is unchanged unless otherwise specified. See orders above and comments below for details of workup or medication orders. _________________________________________________  Plan:     Return if symptoms worsen or fail to improve.       Electronically signed by Jax Riggins MD on 11/30/22 at 10:23 AM EST

## 2022-11-30 NOTE — PROGRESS NOTES
Previsit chart review: Patient seen twice in the emergency room on 11/24/2022 and 11/26/2022 with confirmed influenza A. It appears she contracted the illness from her granddaughter when babysitting. At the first visit they ordered albuterol codeine cough syrup and Tamiflu. COVID was negative at that time. At her second visit, at [facility] with worse symptoms she had negative EKG, chest x-ray with pneumonitis versus bronchitis on overlying lung disease and borderline cardiomegaly. Patient specifically declined multiple vaccines at her last wellness visit.

## 2022-12-28 ASSESSMENT — ENCOUNTER SYMPTOMS
COUGH: 0
ABDOMINAL PAIN: 0
BACK PAIN: 0
SHORTNESS OF BREATH: 0
WHEEZING: 0
CHEST TIGHTNESS: 0

## 2023-01-24 DIAGNOSIS — G47.00 INSOMNIA, UNSPECIFIED TYPE: ICD-10-CM

## 2023-01-24 RX ORDER — ZOLPIDEM TARTRATE 10 MG/1
TABLET ORAL
Qty: 10 TABLET | Refills: 0 | Status: SHIPPED | OUTPATIENT
Start: 2023-01-24 | End: 2023-02-23

## 2023-01-30 ENCOUNTER — HOSPITAL ENCOUNTER (OUTPATIENT)
Dept: WOMENS IMAGING | Age: 83
Discharge: HOME | End: 2023-01-30
Payer: MEDICARE

## 2023-01-30 DIAGNOSIS — Z12.31 SCREENING MAMMOGRAM FOR BREAST CANCER: ICD-10-CM

## 2023-01-30 PROCEDURE — 77063 BREAST TOMOSYNTHESIS BI: CPT

## 2023-02-07 ENCOUNTER — OFFICE VISIT (OUTPATIENT)
Dept: FAMILY MEDICINE CLINIC | Age: 83
End: 2023-02-07
Payer: MEDICARE

## 2023-02-07 VITALS
HEART RATE: 78 BPM | BODY MASS INDEX: 24 KG/M2 | OXYGEN SATURATION: 97 % | SYSTOLIC BLOOD PRESSURE: 124 MMHG | WEIGHT: 142 LBS | DIASTOLIC BLOOD PRESSURE: 78 MMHG

## 2023-02-07 DIAGNOSIS — N81.10 BLADDER PROLAPSE, FEMALE, ACQUIRED: Primary | ICD-10-CM

## 2023-02-07 DIAGNOSIS — R10.2 PELVIC PAIN: ICD-10-CM

## 2023-02-07 DIAGNOSIS — G47.00 INSOMNIA, UNSPECIFIED TYPE: ICD-10-CM

## 2023-02-07 LAB
BILIRUBIN, POC: NEGATIVE
BLOOD URINE, POC: ABNORMAL
CLARITY, POC: CLEAR
COLOR, POC: YELLOW
GLUCOSE URINE, POC: NEGATIVE
KETONES, POC: NEGATIVE
LEUKOCYTE EST, POC: ABNORMAL
NITRITE, POC: NEGATIVE
PH, POC: 6
PROTEIN, POC: NEGATIVE
SPECIFIC GRAVITY, POC: 1.02
UROBILINOGEN, POC: ABNORMAL

## 2023-02-07 PROCEDURE — 81002 URINALYSIS NONAUTO W/O SCOPE: CPT | Performed by: FAMILY MEDICINE

## 2023-02-07 PROCEDURE — 1123F ACP DISCUSS/DSCN MKR DOCD: CPT | Performed by: FAMILY MEDICINE

## 2023-02-07 PROCEDURE — G8420 CALC BMI NORM PARAMETERS: HCPCS | Performed by: FAMILY MEDICINE

## 2023-02-07 PROCEDURE — 1036F TOBACCO NON-USER: CPT | Performed by: FAMILY MEDICINE

## 2023-02-07 PROCEDURE — 1090F PRES/ABSN URINE INCON ASSESS: CPT | Performed by: FAMILY MEDICINE

## 2023-02-07 PROCEDURE — G8484 FLU IMMUNIZE NO ADMIN: HCPCS | Performed by: FAMILY MEDICINE

## 2023-02-07 PROCEDURE — G8427 DOCREV CUR MEDS BY ELIG CLIN: HCPCS | Performed by: FAMILY MEDICINE

## 2023-02-07 PROCEDURE — G8399 PT W/DXA RESULTS DOCUMENT: HCPCS | Performed by: FAMILY MEDICINE

## 2023-02-07 PROCEDURE — 99213 OFFICE O/P EST LOW 20 MIN: CPT | Performed by: FAMILY MEDICINE

## 2023-02-07 RX ORDER — VIT C/B6/B5/MAGNESIUM/HERB 173 50-5-6-5MG
CAPSULE ORAL DAILY
COMMUNITY

## 2023-02-07 RX ORDER — ZOLPIDEM TARTRATE 10 MG/1
TABLET ORAL
Qty: 10 TABLET | Refills: 5 | Status: SHIPPED | OUTPATIENT
Start: 2023-02-07 | End: 2023-08-07

## 2023-02-07 RX ORDER — MULTIVITAMIN WITH IRON
250 TABLET ORAL DAILY
COMMUNITY

## 2023-02-07 RX ORDER — AMPICILLIN TRIHYDRATE 250 MG
CAPSULE ORAL
COMMUNITY

## 2023-02-07 NOTE — PROGRESS NOTES
Chief Complaint   Patient presents with    Follow-up     On multiple issues including insomnia, on Ambien but small dose only due to risk    Other     Vaginal bleeding,  foul smelling urine and vaginal discharge Pt reports seeing blood in her underwear        [place] NARRATIVE:    Reports recurrent and persistent GYN and urinary issues as above. She has actually seeing specialist in the past.  She had a bladder repair procedure about 10 years ago. Since then she has been seen and apparently is not eligible for reoperation and was not able to use a pessary. Reports recent mammo normal.  Cscope not require due to age. Patient is established unless otherwise noted. Above chief complaint and [place] obtained by this physician provider. Review of Systems   Constitutional:  Negative for activity change, chills, fatigue and fever. HENT:  Negative for congestion. Respiratory:  Negative for cough, chest tightness, shortness of breath and wheezing. Cardiovascular:  Negative for chest pain and leg swelling. Gastrointestinal:  Negative for abdominal pain. Genitourinary:  Positive for pelvic pain, vaginal bleeding and vaginal pain. Musculoskeletal:  Negative for back pain and myalgias. Skin:  Negative for rash. Psychiatric/Behavioral:  Negative for behavioral problems and dysphoric mood. Allergies   Allergen Reactions    Prevnar 13 [Pneumococcal 13-Latisha Conj Vacc] Rash     Swelling of injection site and upper arm for more than 7 days. No infection. Amoxicillin Rash    Flexeril [Cyclobenzaprine Hcl] Nausea And Vomiting     Allergy historyupdated.     Outpatient Medications Marked as Taking for the 2/7/23 encounter (Office Visit) with Julianna Saint, MD   Medication Sig Dispense Refill    zolpidem (AMBIEN) 10 MG tablet TAKE 1/2 TABLET BY MOUTH EVERY NIGHT AS NEEDED FOR SLEEP 10 tablet 5    Cholecalciferol (VITAMIN D3) 125 MCG (5000 UT) TABS Take by mouth      Cinnamon 500 MG CAPS Take by mouth turmeric 500 MG CAPS Take by mouth daily      magnesium (MAGNESIUM-OXIDE) 250 MG TABS tablet Take 250 mg by mouth daily      Multiple Vitamins-Minerals (ICAPS AREDS 2 PO) Take by mouth      [] mupirocin (BACTROBAN) 2 % ointment Apply topically 3 times daily. 22 g 5    fluconazole (DIFLUCAN) 150 MG tablet Take 1 tablet by mouth daily 5 tablet 0    brimonidine (ALPHAGAN) 0.2 % ophthalmic solution       UNABLE TO FIND Balance of nature      latanoprost (XALATAN) 0.005 % ophthalmic solution       vitamin B-12 (CYANOCOBALAMIN) 100 MCG tablet Take 50 mcg by mouth daily      Ascorbic Acid (VITAMIN C) 500 MG tablet Take 500 mg by mouth daily         Tobacco use history updated. Social History     Tobacco Use   Smoking Status Never   Smokeless Tobacco Never        Nursing note reviewed. Vitals:    23 1452   BP: 124/78   Site: Left Upper Arm   Position: Sitting   Cuff Size: Medium Adult   Pulse: 78   SpO2: 97%   Weight: 142 lb (64.4 kg)          BP Readings from Last 3 Encounters:   23 124/78   22 128/88   22 125/71     Wt Readings from Last 3 Encounters:   23 142 lb (64.4 kg)   22 134 lb (60.8 kg)   22 135 lb (61.2 kg)     Body mass index is 24 kg/m². Results for POC orders placed in visit on 23   POCT Urinalysis no Micro   Result Value Ref Range    Color, UA YELLOW     Clarity, UA CLEAR     Glucose, UA POC negative     Bilirubin, UA negative     Ketones, UA negative     Spec Grav, UA 1.025     Blood, UA POC trace-intact     pH, UA 6.0     Protein, UA POC negative     Urobilinogen, UA 0.2 e.u./dl     Leukocytes, UA moderate     Nitrite, UA negative          Physical Exam  Vitals and nursing note reviewed. Constitutional:       General: She is not in acute distress. Appearance: She is well-developed. She is not diaphoretic. HENT:      Head: Normocephalic. Eyes:      General:         Right eye: No discharge. Left eye: No discharge. Conjunctiva/sclera: Conjunctivae normal.      Pupils: Pupils are equal, round, and reactive to light. Cardiovascular:      Rate and Rhythm: Normal rate and regular rhythm. Heart sounds: Normal heart sounds. No murmur heard. Pulmonary:      Effort: Pulmonary effort is normal. No respiratory distress. Breath sounds: Normal breath sounds. No wheezing or rales. Abdominal:      Hernia: There is no hernia in the left inguinal area or right inguinal area. Genitourinary:     General: Normal vulva. Exam position: Knee-chest position. Pubic Area: No rash or pubic lice. Labia:         Right: No rash or tenderness. Left: No rash or tenderness. Urethra: Prolapse (Entire bladder is prolapsed into the vaginal opening probably about orange size) present. Musculoskeletal:      Cervical back: Normal range of motion. Skin:     General: Skin is warm and dry. Neurological:      Mental Status: She is alert and oriented to person, place, and time. Psychiatric:         Behavior: Behavior normal.         _________________________________________________  Assessment:     1. Bladder prolapse, female, acquired  -     External Referral To Urology  -     POCT Urinalysis no Micro  -     mupirocin (BACTROBAN) 2 % ointment; Apply topically 3 times daily. , Disp-22 g, R-5, Normal  -     Culture, Urine  2. Pelvic pain  -     External Referral To Urology  -     POCT Urinalysis no Micro  -     Culture, Urine  3. Insomnia, unspecified type  -     zolpidem (AMBIEN) 10 MG tablet; TAKE 1/2 TABLET BY MOUTH EVERY NIGHT AS NEEDED FOR SLEEP, Disp-10 tablet, R-5Normal      Labs will be sent to confirm no infection. Mupirocin prescribed to apply to inflamed areas of the perineum. We will get tertiary urogynecology consult as above. Refill provided of Ambien at reduced dose per usual.  Problems listed above are stable and therapeutic plan is unchanged unless otherwise specified.       See orders above and comments below for details of workup or medication orders. _________________________________________________  Plan:     Return if symptoms worsen or fail to improve, for Keep scheduled appointment in April.       Electronically signed by Ab Mckeon MD on 2/7/23 at 3:05 PM EST

## 2023-02-08 LAB — URINE CULTURE, ROUTINE: NORMAL

## 2023-02-09 NOTE — RESULT ENCOUNTER NOTE
Urine culture did not grow any bacteria. So no antibiotic is needed. Let us know if you do not hear from the urologist that we sent to 2 in [place]    Not routed for staff call but released to [address] only.

## 2023-02-16 ENCOUNTER — TELEPHONE (OUTPATIENT)
Dept: FAMILY MEDICINE CLINIC | Age: 83
End: 2023-02-16

## 2023-02-16 DIAGNOSIS — M79.672 LEFT FOOT PAIN: Primary | ICD-10-CM

## 2023-02-16 NOTE — TELEPHONE ENCOUNTER
----- Message from Shubham Treviño sent at 2/16/2023 11:03 AM EST -----  Subject: Referral Request    Reason for referral request? Patient needs a referral to go see a foot Dr.   the bone of her foot id hard and not easy to walk.    Provider patient wants to be referred to(if known):     Provider Phone Number(if known):[phone]    Additional Information for Provider?   ---------------------------------------------------------------------------  --------------  [id number]    [phone]; OK to leave message on voicemail  ---------------------------------------------------------------------------  --------------

## 2023-02-16 NOTE — TELEPHONE ENCOUNTER
Patient needs a referral to go see a foot Dr.   the bone of her foot id hard and not easy to walk.    Provider patient wants to be referred to(if known

## 2023-02-19 NOTE — TELEPHONE ENCOUNTER
We will need at least some history of about her foot pain. She did not share it at her last visit. Which foot is affected? How long is it been hurting? Has she had any injury? Has she tried tennis shoes? Any skin breakdown or deformity like a bunion? I cannot generate a referral about this information.   Her alternative would be to be seen although her insurance may not require a referral she can probably make it herself with Dr. Corinna Talbot if she prefers

## 2023-02-20 NOTE — TELEPHONE ENCOUNTER
Reports podiatry told her she needed a referral. Reports on bottom of left foot its hard and hurts to walk, possibly corns size of nickel per patient. Reports on going for a few months. She put foam in her sock to help with her walking.

## 2023-02-21 NOTE — TELEPHONE ENCOUNTER
Referral to general podiatrist in the chart for left foot pain. She did not give a provider's name but if a \"podiatrist told her she needed a referral\" then obviously she is still seeing 1 she will need to give us the name or she can come  the referral without a name on it if she like.

## 2023-02-27 ASSESSMENT — ENCOUNTER SYMPTOMS
ABDOMINAL PAIN: 0
WHEEZING: 0
BACK PAIN: 0
COUGH: 0
SHORTNESS OF BREATH: 0
CHEST TIGHTNESS: 0

## 2023-03-10 NOTE — TELEPHONE ENCOUNTER
Called and spoke to patient and she is seeing Dr. Antonia Mclain.  He took her without the referral.

## 2023-03-28 ENCOUNTER — OFFICE VISIT (OUTPATIENT)
Dept: FAMILY MEDICINE CLINIC | Age: 83
End: 2023-03-28
Payer: MEDICARE

## 2023-03-28 VITALS
DIASTOLIC BLOOD PRESSURE: 80 MMHG | SYSTOLIC BLOOD PRESSURE: 120 MMHG | BODY MASS INDEX: 23.83 KG/M2 | HEART RATE: 62 BPM | WEIGHT: 141 LBS | OXYGEN SATURATION: 97 %

## 2023-03-28 DIAGNOSIS — N32.89: ICD-10-CM

## 2023-03-28 DIAGNOSIS — Z90.722 HX OF BILATERAL OOPHORECTOMY: ICD-10-CM

## 2023-03-28 DIAGNOSIS — N81.10 BLADDER PROLAPSE, FEMALE, ACQUIRED: Primary | ICD-10-CM

## 2023-03-28 DIAGNOSIS — Z90.710 HISTORY OF TOTAL ABDOMINAL HYSTERECTOMY: ICD-10-CM

## 2023-03-28 DIAGNOSIS — Z85.41 HISTORY OF CERVICAL CANCER IN ADULTHOOD: ICD-10-CM

## 2023-03-28 PROCEDURE — 1123F ACP DISCUSS/DSCN MKR DOCD: CPT | Performed by: FAMILY MEDICINE

## 2023-03-28 PROCEDURE — G8420 CALC BMI NORM PARAMETERS: HCPCS | Performed by: FAMILY MEDICINE

## 2023-03-28 PROCEDURE — G8427 DOCREV CUR MEDS BY ELIG CLIN: HCPCS | Performed by: FAMILY MEDICINE

## 2023-03-28 PROCEDURE — 1090F PRES/ABSN URINE INCON ASSESS: CPT | Performed by: FAMILY MEDICINE

## 2023-03-28 PROCEDURE — 99213 OFFICE O/P EST LOW 20 MIN: CPT | Performed by: FAMILY MEDICINE

## 2023-03-28 PROCEDURE — 1036F TOBACCO NON-USER: CPT | Performed by: FAMILY MEDICINE

## 2023-03-28 PROCEDURE — G8399 PT W/DXA RESULTS DOCUMENT: HCPCS | Performed by: FAMILY MEDICINE

## 2023-03-28 PROCEDURE — G8484 FLU IMMUNIZE NO ADMIN: HCPCS | Performed by: FAMILY MEDICINE

## 2023-04-27 ASSESSMENT — ENCOUNTER SYMPTOMS
COUGH: 0
WHEEZING: 0
CHEST TIGHTNESS: 0
BACK PAIN: 0
SHORTNESS OF BREATH: 0
ABDOMINAL PAIN: 0

## 2023-04-28 ENCOUNTER — TELEPHONE (OUTPATIENT)
Dept: FAMILY MEDICINE CLINIC | Age: 83
End: 2023-04-28

## 2023-04-28 NOTE — TELEPHONE ENCOUNTER
Called patient who reports she is thinking about changing doctors. Reports she just has so much right now and she thinks its best to change doctors. Consent (Near Eyelid Margin)/Introductory Paragraph: The rationale for Mohs was explained to the patient and consent was obtained. The risks, benefits and alternatives to therapy were discussed in detail. Specifically, the risks of ectropion or eyelid deformity, infection, scarring, bleeding, prolonged wound healing, incomplete removal, allergy to anesthesia, nerve injury and recurrence were addressed. Prior to the procedure, the treatment site was clearly identified and confirmed by the patient. All components of Universal Protocol/PAUSE Rule completed.

## 2023-04-28 NOTE — TELEPHONE ENCOUNTER
----- Message from Halley Petty MD sent at 4/27/2023  1:51 PM EDT -----  Please give her a call to schedule [address] in 3 months or so?

## 2023-08-12 DIAGNOSIS — G47.00 INSOMNIA, UNSPECIFIED TYPE: ICD-10-CM

## 2023-08-12 RX ORDER — ZOLPIDEM TARTRATE 10 MG/1
TABLET ORAL
Qty: 10 TABLET | OUTPATIENT
Start: 2023-08-12

## (undated) DEVICE — SUTURE NONABSORBABLE MONOFILAMENT 4-0 FS-2 18 IN ETHILON 662H

## (undated) DEVICE — DRESSING,GAUZE,XEROFORM,CURAD,1"X8",ST: Brand: CURAD

## (undated) DEVICE — PADDING UNDERCAST W2INXL4YD COT WYTEX

## (undated) DEVICE — BANDAGE,ELASTIC,ESMARK,STERILE,4"X9',LF: Brand: MEDLINE

## (undated) DEVICE — INTENDED FOR TISSUE SEPARATION, AND OTHER PROCEDURES THAT REQUIRE A SHARP SURGICAL BLADE TO PUNCTURE OR CUT.: Brand: BARD-PARKER ® STAINLESS STEEL BLADES

## (undated) DEVICE — BANDAGE COMPR W2INXL5YD WHT BGE POLY COT M E WRP WV HK AND

## (undated) DEVICE — DRAPE,U/ SHT,SPLIT,PLAS,STERIL: Brand: MEDLINE

## (undated) DEVICE — BANDAGE COMPR 2IN X5YD SELF ADH RED N STRLE CO FLX

## (undated) DEVICE — PADDING,UNDERCAST,COTTON, 4"X4YD STERILE: Brand: MEDLINE

## (undated) DEVICE — DRAPE SHEET ULTRAGARD: Brand: MEDLINE

## (undated) DEVICE — PADDING CAST 2INW X 4YDL COTTON RAYON ROLYAN LATEX FREE

## (undated) DEVICE — BANDAGE COMPR W2INXL5.5YD BGE POLY COT BLEND YARN HNY STRP

## (undated) DEVICE — STERILE LATEX POWDER-FREE SURGICAL GLOVESWITH NITRILE COATING: Brand: PROTEXIS

## (undated) DEVICE — 3M™ STERI-DRAPE™ U-DRAPE 1015: Brand: STERI-DRAPE™